# Patient Record
Sex: MALE | Employment: UNEMPLOYED | URBAN - METROPOLITAN AREA
[De-identification: names, ages, dates, MRNs, and addresses within clinical notes are randomized per-mention and may not be internally consistent; named-entity substitution may affect disease eponyms.]

---

## 2021-03-24 ENCOUNTER — TRANSFERRED RECORDS (OUTPATIENT)
Dept: HEALTH INFORMATION MANAGEMENT | Facility: CLINIC | Age: 51
End: 2021-03-24

## 2021-08-04 ENCOUNTER — TRANSFERRED RECORDS (OUTPATIENT)
Dept: HEALTH INFORMATION MANAGEMENT | Facility: CLINIC | Age: 51
End: 2021-08-04

## 2021-08-06 ENCOUNTER — MEDICAL CORRESPONDENCE (OUTPATIENT)
Dept: HEALTH INFORMATION MANAGEMENT | Facility: CLINIC | Age: 51
End: 2021-08-06

## 2021-08-11 NOTE — TELEPHONE ENCOUNTER
FUTURE VISIT INFORMATION      FUTURE VISIT INFORMATION:    Date: 8/25/21    Time: 11:30am    Location: [facility] [place]  REFERRAL INFORMATION:    Referring provider:  N/A    Referring providers clinic:  N/A    Reason for visit/diagnosis  foot wound    RECORDS REQUESTED FROM:       Clinic name Comments Records Status Imaging Status   [facility] OV/notes 2/15/21-3/18/21 Care Everywhere    [facility]  Ov/notes 2/1/21-3/23/21 Care Everywhere

## 2021-08-25 ENCOUNTER — PRE VISIT (OUTPATIENT)
Dept: SURGERY | Facility: CLINIC | Age: 51
End: 2021-08-25

## 2021-09-27 ENCOUNTER — HOSPITAL ENCOUNTER (INPATIENT)
Facility: CLINIC | Age: 51
LOS: 3 days | Discharge: HOME | End: 2021-09-30
Attending: EMERGENCY MEDICINE | Admitting: FAMILY MEDICINE
Payer: MEDICAID

## 2021-09-27 ENCOUNTER — APPOINTMENT (OUTPATIENT)
Dept: GENERAL RADIOLOGY | Facility: CLINIC | Age: 51
End: 2021-09-27
Payer: MEDICAID

## 2021-09-27 DIAGNOSIS — I73.9 PERIPHERAL ARTERY DISEASE (H): ICD-10-CM

## 2021-09-27 DIAGNOSIS — Z11.52 ENCOUNTER FOR SCREENING LABORATORY TESTING FOR SEVERE ACUTE RESPIRATORY SYNDROME CORONAVIRUS 2 (SARS-COV-2): ICD-10-CM

## 2021-09-27 DIAGNOSIS — E44.0 MODERATE PROTEIN-CALORIE MALNUTRITION (H): ICD-10-CM

## 2021-09-27 DIAGNOSIS — S81.802A OPEN WOUND OF LEFT KNEE, LEG, AND ANKLE, INITIAL ENCOUNTER: Primary | ICD-10-CM

## 2021-09-27 DIAGNOSIS — R89.5 POSITIVE CULTURE FINDINGS IN WOUND: ICD-10-CM

## 2021-09-27 DIAGNOSIS — S91.002A OPEN WOUND OF LEFT KNEE, LEG, AND ANKLE, INITIAL ENCOUNTER: Primary | ICD-10-CM

## 2021-09-27 DIAGNOSIS — S81.002A OPEN WOUND OF LEFT KNEE, LEG, AND ANKLE, INITIAL ENCOUNTER: Primary | ICD-10-CM

## 2021-09-27 DIAGNOSIS — E11.9 TYPE 2 DIABETES MELLITUS (H): ICD-10-CM

## 2021-09-27 PROBLEM — R78.81 POSITIVE BLOOD CULTURE: Status: ACTIVE | Noted: 2021-09-27

## 2021-09-27 LAB
ALBUMIN SERPL-MCNC: 2.4 G/DL (ref 3.4–5)
ALP SERPL-CCNC: 142 U/L (ref 40–150)
ALT SERPL W P-5'-P-CCNC: 48 U/L (ref 0–70)
ANION GAP SERPL CALCULATED.3IONS-SCNC: 10 MMOL/L (ref 3–14)
AST SERPL W P-5'-P-CCNC: 98 U/L (ref 0–45)
BASOPHILS # BLD AUTO: 0 10E3/UL (ref 0–0.2)
BASOPHILS NFR BLD AUTO: 0 %
BILIRUB SERPL-MCNC: 0.8 MG/DL (ref 0.2–1.3)
BUN SERPL-MCNC: 21 MG/DL (ref 7–30)
CALCIUM SERPL-MCNC: 8.8 MG/DL (ref 8.5–10.1)
CHLORIDE BLD-SCNC: 101 MMOL/L (ref 94–109)
CO2 SERPL-SCNC: 24 MMOL/L (ref 20–32)
CREAT SERPL-MCNC: 1.75 MG/DL (ref 0.66–1.25)
CRP SERPL-MCNC: 31 MG/L (ref 0–8)
EOSINOPHIL # BLD AUTO: 0.1 10E3/UL (ref 0–0.7)
EOSINOPHIL NFR BLD AUTO: 1 %
ERYTHROCYTE [DISTWIDTH] IN BLOOD BY AUTOMATED COUNT: 14.5 % (ref 10–15)
ERYTHROCYTE [SEDIMENTATION RATE] IN BLOOD BY WESTERGREN METHOD: 54 MM/HR (ref 0–20)
GFR SERPL CREATININE-BSD FRML MDRD: 44 ML/MIN/1.73M2
GLUCOSE BLD-MCNC: 111 MG/DL (ref 70–99)
HCT VFR BLD AUTO: 38.8 % (ref 40–53)
HGB BLD-MCNC: 12.9 G/DL (ref 13.3–17.7)
IMM GRANULOCYTES # BLD: 0.1 10E3/UL
IMM GRANULOCYTES NFR BLD: 1 %
LYMPHOCYTES # BLD AUTO: 0.7 10E3/UL (ref 0.8–5.3)
LYMPHOCYTES NFR BLD AUTO: 10 %
MAGNESIUM SERPL-MCNC: 1.5 MG/DL (ref 1.6–2.3)
MCH RBC QN AUTO: 30.8 PG (ref 26.5–33)
MCHC RBC AUTO-ENTMCNC: 33.2 G/DL (ref 31.5–36.5)
MCV RBC AUTO: 93 FL (ref 78–100)
MONOCYTES # BLD AUTO: 0.7 10E3/UL (ref 0–1.3)
MONOCYTES NFR BLD AUTO: 10 %
NEUTROPHILS # BLD AUTO: 5.5 10E3/UL (ref 1.6–8.3)
NEUTROPHILS NFR BLD AUTO: 78 %
NRBC # BLD AUTO: 0 10E3/UL
NRBC BLD AUTO-RTO: 0 /100
PHOSPHATE SERPL-MCNC: 3.3 MG/DL (ref 2.5–4.5)
PLATELET # BLD AUTO: 206 10E3/UL (ref 150–450)
POTASSIUM BLD-SCNC: 3.8 MMOL/L (ref 3.4–5.3)
PROT SERPL-MCNC: 8.2 G/DL (ref 6.8–8.8)
RBC # BLD AUTO: 4.19 10E6/UL (ref 4.4–5.9)
SARS-COV-2 RNA RESP QL NAA+PROBE: NEGATIVE
SODIUM SERPL-SCNC: 135 MMOL/L (ref 133–144)
TSH SERPL DL<=0.005 MIU/L-ACNC: 1.37 MU/L (ref 0.4–4)
WBC # BLD AUTO: 7.1 10E3/UL (ref 4–11)

## 2021-09-27 PROCEDURE — 36415 COLL VENOUS BLD VENIPUNCTURE: CPT | Performed by: EMERGENCY MEDICINE

## 2021-09-27 PROCEDURE — 85652 RBC SED RATE AUTOMATED: CPT | Performed by: EMERGENCY MEDICINE

## 2021-09-27 PROCEDURE — 84100 ASSAY OF PHOSPHORUS: CPT | Performed by: STUDENT IN AN ORGANIZED HEALTH CARE EDUCATION/TRAINING PROGRAM

## 2021-09-27 PROCEDURE — 87040 BLOOD CULTURE FOR BACTERIA: CPT | Performed by: EMERGENCY MEDICINE

## 2021-09-27 PROCEDURE — 250N000011 HC RX IP 250 OP 636: Performed by: EMERGENCY MEDICINE

## 2021-09-27 PROCEDURE — U0005 INFEC AGEN DETEC AMPLI PROBE: HCPCS | Performed by: EMERGENCY MEDICINE

## 2021-09-27 PROCEDURE — 99285 EMERGENCY DEPT VISIT HI MDM: CPT | Performed by: EMERGENCY MEDICINE

## 2021-09-27 PROCEDURE — 96365 THER/PROPH/DIAG IV INF INIT: CPT | Performed by: EMERGENCY MEDICINE

## 2021-09-27 PROCEDURE — 85025 COMPLETE CBC W/AUTO DIFF WBC: CPT | Performed by: EMERGENCY MEDICINE

## 2021-09-27 PROCEDURE — 99285 EMERGENCY DEPT VISIT HI MDM: CPT | Mod: 25 | Performed by: EMERGENCY MEDICINE

## 2021-09-27 PROCEDURE — 84134 ASSAY OF PREALBUMIN: CPT | Performed by: STUDENT IN AN ORGANIZED HEALTH CARE EDUCATION/TRAINING PROGRAM

## 2021-09-27 PROCEDURE — 83735 ASSAY OF MAGNESIUM: CPT | Performed by: STUDENT IN AN ORGANIZED HEALTH CARE EDUCATION/TRAINING PROGRAM

## 2021-09-27 PROCEDURE — 73610 X-RAY EXAM OF ANKLE: CPT | Mod: LT

## 2021-09-27 PROCEDURE — 120N000011 HC R&B TRANSPLANT UMMC

## 2021-09-27 PROCEDURE — 80053 COMPREHEN METABOLIC PANEL: CPT | Performed by: EMERGENCY MEDICINE

## 2021-09-27 PROCEDURE — 84443 ASSAY THYROID STIM HORMONE: CPT | Performed by: STUDENT IN AN ORGANIZED HEALTH CARE EDUCATION/TRAINING PROGRAM

## 2021-09-27 PROCEDURE — 86140 C-REACTIVE PROTEIN: CPT | Performed by: EMERGENCY MEDICINE

## 2021-09-27 PROCEDURE — 83036 HEMOGLOBIN GLYCOSYLATED A1C: CPT | Performed by: STUDENT IN AN ORGANIZED HEALTH CARE EDUCATION/TRAINING PROGRAM

## 2021-09-27 PROCEDURE — 99223 1ST HOSP IP/OBS HIGH 75: CPT | Mod: AI | Performed by: FAMILY MEDICINE

## 2021-09-27 PROCEDURE — 96366 THER/PROPH/DIAG IV INF ADDON: CPT | Performed by: EMERGENCY MEDICINE

## 2021-09-27 PROCEDURE — C9803 HOPD COVID-19 SPEC COLLECT: HCPCS | Performed by: EMERGENCY MEDICINE

## 2021-09-27 PROCEDURE — 73610 X-RAY EXAM OF ANKLE: CPT | Mod: 26 | Performed by: RADIOLOGY

## 2021-09-27 PROCEDURE — 258N000003 HC RX IP 258 OP 636: Performed by: EMERGENCY MEDICINE

## 2021-09-27 RX ORDER — LEVOFLOXACIN 750 MG/1
750 TABLET, FILM COATED ORAL DAILY
Status: ON HOLD | COMMUNITY
End: 2021-09-30

## 2021-09-27 RX ORDER — MULTIPLE VITAMINS W/ MINERALS TAB 9MG-400MCG
1 TAB ORAL DAILY
Status: DISCONTINUED | OUTPATIENT
Start: 2021-09-28 | End: 2021-09-30 | Stop reason: HOSPADM

## 2021-09-27 RX ORDER — LIDOCAINE 40 MG/G
CREAM TOPICAL
Status: DISCONTINUED | OUTPATIENT
Start: 2021-09-27 | End: 2021-09-30 | Stop reason: HOSPADM

## 2021-09-27 RX ORDER — PIPERACILLIN SODIUM, TAZOBACTAM SODIUM 4; .5 G/20ML; G/20ML
4.5 INJECTION, POWDER, LYOPHILIZED, FOR SOLUTION INTRAVENOUS ONCE
Status: COMPLETED | OUTPATIENT
Start: 2021-09-27 | End: 2021-09-27

## 2021-09-27 RX ORDER — ACETAMINOPHEN 650 MG/1
650 SUPPOSITORY RECTAL EVERY 6 HOURS PRN
Status: DISCONTINUED | OUTPATIENT
Start: 2021-09-27 | End: 2021-09-30 | Stop reason: HOSPADM

## 2021-09-27 RX ORDER — DIAZEPAM 5 MG
10 TABLET ORAL EVERY 30 MIN PRN
Status: DISCONTINUED | OUTPATIENT
Start: 2021-09-27 | End: 2021-09-29

## 2021-09-27 RX ORDER — SODIUM CHLORIDE, SODIUM LACTATE, POTASSIUM CHLORIDE, CALCIUM CHLORIDE 600; 310; 30; 20 MG/100ML; MG/100ML; MG/100ML; MG/100ML
INJECTION, SOLUTION INTRAVENOUS CONTINUOUS
Status: DISCONTINUED | OUTPATIENT
Start: 2021-09-27 | End: 2021-09-28

## 2021-09-27 RX ORDER — NICOTINE POLACRILEX 4 MG
15-30 LOZENGE BUCCAL
Status: DISCONTINUED | OUTPATIENT
Start: 2021-09-27 | End: 2021-09-30 | Stop reason: HOSPADM

## 2021-09-27 RX ORDER — CEPHALEXIN 500 MG/1
500 CAPSULE ORAL 2 TIMES DAILY
Status: ON HOLD | COMMUNITY
End: 2021-09-30

## 2021-09-27 RX ORDER — DEXTROSE MONOHYDRATE 25 G/50ML
25-50 INJECTION, SOLUTION INTRAVENOUS
Status: DISCONTINUED | OUTPATIENT
Start: 2021-09-27 | End: 2021-09-30 | Stop reason: HOSPADM

## 2021-09-27 RX ORDER — SODIUM CHLOR/HYPOCHLOROUS ACID 0.033 %
SOLUTION, IRRIGATION IRRIGATION
Status: CANCELLED | OUTPATIENT
Start: 2021-09-27

## 2021-09-27 RX ORDER — SODIUM CHLORIDE 9 MG/ML
INJECTION, SOLUTION INTRAVENOUS CONTINUOUS
Status: DISCONTINUED | OUTPATIENT
Start: 2021-09-27 | End: 2021-09-27

## 2021-09-27 RX ORDER — SODIUM CHLOR/HYPOCHLOROUS ACID 0.033 %
SOLUTION, IRRIGATION IRRIGATION
COMMUNITY
Start: 2021-03-25

## 2021-09-27 RX ORDER — FOLIC ACID 1 MG/1
1 TABLET ORAL DAILY
Status: DISCONTINUED | OUTPATIENT
Start: 2021-09-28 | End: 2021-09-30 | Stop reason: HOSPADM

## 2021-09-27 RX ORDER — PIPERACILLIN SODIUM, TAZOBACTAM SODIUM 4; .5 G/20ML; G/20ML
4.5 INJECTION, POWDER, LYOPHILIZED, FOR SOLUTION INTRAVENOUS EVERY 6 HOURS
Status: DISCONTINUED | OUTPATIENT
Start: 2021-09-28 | End: 2021-09-29

## 2021-09-27 RX ORDER — LANOLIN ALCOHOL/MO/W.PET/CERES
100 CREAM (GRAM) TOPICAL DAILY
Status: DISCONTINUED | OUTPATIENT
Start: 2021-09-28 | End: 2021-09-30 | Stop reason: HOSPADM

## 2021-09-27 RX ORDER — ACETAMINOPHEN 325 MG/1
650 TABLET ORAL EVERY 6 HOURS PRN
Status: DISCONTINUED | OUTPATIENT
Start: 2021-09-27 | End: 2021-09-30 | Stop reason: HOSPADM

## 2021-09-27 RX ADMIN — PIPERACILLIN SODIUM AND TAZOBACTAM SODIUM 4.5 G: 4; .5 INJECTION, POWDER, LYOPHILIZED, FOR SOLUTION INTRAVENOUS at 17:58

## 2021-09-27 RX ADMIN — SODIUM CHLORIDE: 9 INJECTION, SOLUTION INTRAVENOUS at 16:52

## 2021-09-27 RX ADMIN — VANCOMYCIN HYDROCHLORIDE 1750 MG: 1 INJECTION, POWDER, LYOPHILIZED, FOR SOLUTION INTRAVENOUS at 18:38

## 2021-09-27 RX ADMIN — SODIUM CHLORIDE 1000 ML: 9 INJECTION, SOLUTION INTRAVENOUS at 15:52

## 2021-09-27 ASSESSMENT — ACTIVITIES OF DAILY LIVING (ADL)
VISION_MANAGEMENT: GLASSES
WALKING_OR_CLIMBING_STAIRS: OTHER (SEE COMMENTS)
DOING_ERRANDS_INDEPENDENTLY_DIFFICULTY: YES
PATIENT_/_FAMILY_COMMUNICATION_STYLE: SPOKEN LANGUAGE (ENGLISH OR BILINGUAL)
WALKING_OR_CLIMBING_STAIRS_DIFFICULTY: YES
NUMBER_OF_TIMES_PATIENT_HAS_FALLEN_WITHIN_LAST_SIX_MONTHS: 3
VISION_MANAGEMENT: GLASSES
TOILETING_ISSUES: NO
WEAR_GLASSES_OR_BLIND: YES
PATIENT_/_FAMILY_COMMUNICATION_STYLE: SPOKEN LANGUAGE (ENGLISH OR BILINGUAL)
CONCENTRATING,_REMEMBERING_OR_MAKING_DECISIONS_DIFFICULTY: NO
WALKING_OR_CLIMBING_STAIRS_DIFFICULTY: YES
DIFFICULTY_COMMUNICATING: NO
DRESSING/BATHING_DIFFICULTY: YES
CONCENTRATING,_REMEMBERING_OR_MAKING_DECISIONS_DIFFICULTY: NO
DRESSING/BATHING_DIFFICULTY: YES
WHICH_OF_THE_ABOVE_FUNCTIONAL_RISKS_HAD_A_RECENT_ONSET_OR_CHANGE?: FALL HISTORY;AMBULATION
TOILETING_ISSUES: NO
FALL_HISTORY_WITHIN_LAST_SIX_MONTHS: YES
DOING_ERRANDS_INDEPENDENTLY_DIFFICULTY: YES
EQUIPMENT_CURRENTLY_USED_AT_HOME: WALKER, ROLLING;OTHER (SEE COMMENTS)
HEARING_DIFFICULTY_OR_DEAF: NO
DIFFICULTY_COMMUNICATING: NO
WEAR_GLASSES_OR_BLIND: YES
HEARING_DIFFICULTY_OR_DEAF: NO
DIFFICULTY_EATING/SWALLOWING: NO
DIFFICULTY_EATING/SWALLOWING: NO

## 2021-09-27 ASSESSMENT — MIFFLIN-ST. JEOR
SCORE: 1652.78
SCORE: 1699.04

## 2021-09-27 NOTE — ED NOTES
[facility]   ED Nurse to Floor Handoff     Son Mahoney is a 51 year old male who speaks English and lives with family members,  in a home  They arrived in the ED by car from clinic    ED Chief Complaint: Wound Infection    ED Dx;   Final diagnoses:   Positive blood culture   Open wound of left knee, leg, and ankle, initial encounter         Needed?: No    Allergies: No Known Allergies.  Past Medical Hx: History reviewed. No pertinent past medical history.   Baseline Mental status: WDL  Current Mental Status changes: at basesline    Infection present or suspected this encounter: cultures pending  Sepsis suspected: No  Isolation type: No active isolations  Patient tested for COVID 19 prior to admission: YES     Activity level - Baseline/Home:  Walker  Activity Level - Current:   Walker    Bariatric equipment needed?: No    In the ED these meds were given:   Medications   0.9% sodium chloride BOLUS (0 mLs Intravenous Stopped 9/27/21 1652)     Followed by   sodium chloride 0.9% infusion ( Intravenous New Bag 9/27/21 1652)   piperacillin-tazobactam (ZOSYN) 4.5 g vial to attach to  mL bag (has no administration in time range)   vancomycin (VANCOCIN) 1,750 mg in sodium chloride 0.9 % 500 mL intermittent infusion (has no administration in time range)   vancomycin place tavera - receiving intermittent dosing (has no administration in time range)       Drips running?  No    Home pump  No    Current LDAs  Peripheral IV 09/27/21 Right Upper forearm (Active)   Site Assessment WDL 09/27/21 1522   Line Status Saline locked 09/27/21 1522   Phlebitis Scale 0-->no symptoms 09/27/21 1522   Number of days: 0       Labs results:   Labs Ordered and Resulted from Time of ED Arrival Up to the Time of Departure from the ED   COMPREHENSIVE METABOLIC PANEL - Abnormal; Notable for the following components:       Result Value    Creatinine 1.75 (*)     Glucose 111 (*)     AST  "98 (*)     Albumin 2.4 (*)     GFR Estimate 44 (*)     All other components within normal limits   CRP INFLAMMATION - Abnormal; Notable for the following components:    CRP Inflammation 31.0 (*)     All other components within normal limits   ERYTHROCYTE SEDIMENTATION RATE AUTO - Abnormal; Notable for the following components:    Erythrocyte Sedimentation Rate 54 (*)     All other components within normal limits   CBC WITH PLATELETS AND DIFFERENTIAL - Abnormal; Notable for the following components:    RBC Count 4.19 (*)     Hemoglobin 12.9 (*)     Hematocrit 38.8 (*)     Absolute Lymphocytes 0.7 (*)     Absolute Immature Granulocytes 0.1 (*)     All other components within normal limits   BLOOD CULTURE   BLOOD CULTURE   CBC WITH PLATELETS & DIFFERENTIAL    Narrative:     The following orders were created for panel order CBC with platelets differential.  Procedure                               Abnormality         Status                     ---------                               -----------         ------                     CBC with platelets and d...[[id number]]  Abnormal            Final result                 Please view results for these tests on the individual orders.       Imaging Studies: No results found for this or any previous visit (from the past 24 hour(s)).    Recent vital signs:   BP (!) 117/97   Pulse 111   Temp 97.7  F (36.5  C) (Temporal)   Resp 16   Ht 1.905 m (6' 3\")   Wt 71.2 kg (157 lb)   SpO2 99%   BMI 19.62 kg/m      Ian Coma Scale Score: 15 (09/27/21 1501)       Cardiac Rhythm: Tachycardia  Pt needs tele? No  Skin/wound Issues: LLE wound     Code Status: Full Code    Pain control: pt had none    Nausea control: pt had none    Abnormal labs/tests/findings requiring intervention: see epic     Family present during ED course? Yes   Family Comments/Social Situation comments: sister     Tasks needing completion: None    Bruce Kearney, RN  [phone] [facility]    "

## 2021-09-27 NOTE — LETTER
[facility] [address] [address]  [phone]    FACSIMILE TRANSMITTAL SHEET    TO: [facility] - Attention ZULEIKA Ruiz    _____URGENT _____REVIEW ONLY _____PLEASE COMMENT____PLEASE REPLY    NOTES/COMMENTS: Attached please find final discharge orders for Son Mahoney.                                            IF YOU DID NOT RECEIVE THE CORRECT NUMBER OF PAGES OR THE FAX DID NOT COME THROUGH CLEARLY, PLEASE CALL THE SENDER     CONFIDENTIALITY STATEMENT: Confidential information that may accompany this transmission contains protected health information under state and federal law and is legally privileged. This information is intended only for the use of the individual or entity named above and may be used only for carrying out treatment, payment or other healthcare operations. The recipient or person responsible for delivering this information is prohibited by law from disclosing this information without proper authorization to any other party, unless required to do so by law or regulation. If you are not the intended recipient, you are hereby notified that any review, dissemination, distribution, or copying of this message is strictly prohibited. If you have received this communication in error, please destroy the materials and contact us immediately by calling the number listed above. No response indicates that the information was received by the appropriate authorized party

## 2021-09-27 NOTE — ED TRIAGE NOTES
"Triage Assessment & Note:    BP 93/59   Pulse (!) 129   Temp 97.7  F (36.5  C) (Temporal)   Resp 12   Ht 1.905 m (6' 3\")   Wt 71.2 kg (157 lb)   SpO2 93%   BMI 19.62 kg/m        Patient presents with: Pt comes to triage from vascular surgery clinic for left lower ulceration and positive blood cultures. No reports of fever, cough, SOB, CP, or travel.     Home Treatments/Remedies: Home medications    Febrile / Afebrile: afebrile    Duration of C/o: 9 mo    Palak Minaya RN  September 27, 2021        "

## 2021-09-27 NOTE — ED PROVIDER NOTES
History     Chief Complaint   Patient presents with     Wound Infection     HPI  Son Mahoney is a 51 year old male with a past medical history of peripheral vascular disease, type 2 diabetes (with associated diabetic neuropathy, not insulin-dependent), hypertension who presents to the emergency department with a chief complaint of leg wound infection.  The patient was referred from vascular surgery clinic due to ulceration on his left lower extremity with positive blood cultures.  No associated fever, cough, SOB, CP.  No recent travel.  Wound has been present for 9 months.     Per review of records brought in by the patient, he was seen by a Dr. Salvador Trotter (vascular surgery, [place]), who reported that the patient was seen for evaluation of left medial ankle wound that started as an abscess identified on January 24 of this year.  The patient was initially seen in a local emergency department and was transferred to [facility] in [facility].  Since that time, the patient has had the wound debrided and treated with wet-to-dry dressing that was transition to endoform and wound VAC.  The patient was directed to follow-up in clinic on a weekly basis, but was not compliant with this.  Prior to today, the patient was reportedly last seen on 8/4/2021.  Per report, at that time, all previously exposed bone was covered with granulation tissue, however there was a large area of Achilles tendon exposed.  The patient was referred to a plastic surgeon at that time for consultation on flap closure of the area to preserve the tendon.  His physician reportedly talked to Dr. Di Rick at [facility] plastic surgery, but the patient never made an appointment with this provider.  The patient came in for reevaluation today reporting increased drainage from the wound.  No significant associated pain.  The patient has been using endoform and negative pressure at the site.  The patient has been  nonweightbearing and is using a knee roller.  On exam today, left lower extremity ulceration was noted with left medial portion of the wound with overlying granulation tissue and necrotic material/maceration around the edges of the wound.  A bulging was noted at the superior aspect of the wound, which was debrided, with bloody/foul-smelling material drained from this area.  However, it was thought that additional treatment would be needed and that the patient could not be discharged home at this time.  As a result, he was referred here for presumed admission to the hospital and further treatment.  The physician who saw him in clinic today did think that his leg is salvageable at this time.    He has been taking keflex and levaquin at home for several months.    Unfortunately, we do not have access to patient's blood culture results.     I have reviewed the Medications, Allergies, Past Medical and Surgical History, and Social History in the Epic system.    History reviewed. No pertinent past medical history.  History reviewed. No pertinent surgical history.  No current facility-administered medications for this encounter.     Current Outpatient Medications   Medication     cephALEXin (KEFLEX) 500 MG capsule     levofloxacin (LEVAQUIN) 750 MG tablet     No Known Allergies  Past medical history, past surgical history, medications, and allergies were reviewed with the patient. Additional pertinent items: None    Social History     Socioeconomic History     Marital status: Patient Declined     Spouse name: Not on file     Number of children: Not on file     Years of education: Not on file     Highest education level: Not on file   Occupational History     Not on file   Tobacco Use     Smoking status: Never Smoker     Smokeless tobacco: Never Used   Substance and Sexual Activity     Alcohol use: Yes     Drug use: Not Currently     Sexual activity: Not on file   Other Topics Concern     Not on file   Social History  "Narrative     Not on file     Social Determinants of Health     Financial Resource Strain:      Difficulty of Paying Living Expenses:    Food Insecurity:      Worried About Running Out of Food in the Last Year:      Ran Out of Food in the Last Year:    Transportation Needs:      Lack of Transportation (Medical):      Lack of Transportation (Non-Medical):    Physical Activity:      Days of Exercise per Week:      Minutes of Exercise per Session:    Stress:      Feeling of Stress :    Social Connections:      Frequency of Communication with Friends and Family:      Frequency of Social Gatherings with Friends and Family:      Attends Tenri[employer] Services:      Active Member of Clubs or Organizations:      Attends Club or Organization Meetings:      Marital Status:    Intimate Partner Violence:      Fear of Current or Ex-Partner:      Emotionally Abused:      Physically Abused:      Sexually Abused:      Social history was reviewed with the patient. Additional pertinent items: None    Review of Systems  General: No fevers or chills  Skin: See HPI  Eyes: No eye redness or discharge  Ears/Nose/Throat: No rhinorrhea or nasal congestion  Respiratory: No cough or SOB  Cardiovascular: No chest pain or palpitations  Gastrointestinal: No nausea, vomiting, or diarrhea  Genitourinary: No urinary frequency, hematuria, or dysuria  Musculoskeletal: No arthralgias or myalgias  Neurologic: No numbness or weakness  Hematologic/Lymphatic/Immunologic: No leg swelling, no easy bruising/bleeding  Endocrine: No polyuria/polydypsia    A complete review of systems was performed with pertinent positives and negatives noted in the HPI, and all other systems negative.    Physical Exam   BP: 93/59  Pulse: (!) 129  Temp: 97.7  F (36.5  C)  Resp: 12  Height: 190.5 cm (6' 3\")  Weight: 71.2 kg (157 lb)  SpO2: 93 %      General: Well nourished, well developed, NAD  HEENT: EOMI, anicteric. NCAT, MMM  Neck: no jugular venous distension, supple, nl " ROM  Cardiac: Regular rate and rhythm. No murmurs, rubs, or gallops. Normal S1, S2.  Intact peripheral pulses  Pulm: CTAB, no stridor, wheezes, rales, rhonchi   Skin: Warm and dry to the touch.  Wound to LLE as described below  Extremities: wound to medial left lower leg overlying ankle with packing in place, draining foul smelling fluid, distally intact to light touch, patient is able to move L toes without difficulty, diminished DP pulses BL  Neuro: A&Ox3, no gross focal deficits    ED Course        Procedures                          Labs Ordered and Resulted from Time of ED Arrival Up to the Time of Departure from the ED   COMPREHENSIVE METABOLIC PANEL - Abnormal; Notable for the following components:       Result Value    Creatinine 1.75 (*)     Glucose 111 (*)     AST 98 (*)     Albumin 2.4 (*)     GFR Estimate 44 (*)     All other components within normal limits   CRP INFLAMMATION - Abnormal; Notable for the following components:    CRP Inflammation 31.0 (*)     All other components within normal limits   ERYTHROCYTE SEDIMENTATION RATE AUTO - Abnormal; Notable for the following components:    Erythrocyte Sedimentation Rate 54 (*)     All other components within normal limits   CBC WITH PLATELETS AND DIFFERENTIAL - Abnormal; Notable for the following components:    RBC Count 4.19 (*)     Hemoglobin 12.9 (*)     Hematocrit 38.8 (*)     Absolute Lymphocytes 0.7 (*)     Absolute Immature Granulocytes 0.1 (*)     All other components within normal limits   BLOOD CULTURE   BLOOD CULTURE   CBC WITH PLATELETS & DIFFERENTIAL    Narrative:     The following orders were created for panel order CBC with platelets differential.  Procedure                               Abnormality         Status                     ---------                               -----------         ------                     CBC with platelets and d...[[id number]]  Abnormal            Final result                 Please view results for these tests  on the individual orders.            Results for orders placed or performed during the hospital encounter of 09/27/21 (from the past 24 hour(s))   CBC with platelets differential    Narrative    The following orders were created for panel order CBC with platelets differential.  Procedure                               Abnormality         Status                     ---------                               -----------         ------                     CBC with platelets and d...[[id number]]  Abnormal            Final result                 Please view results for these tests on the individual orders.   Comprehensive metabolic panel   Result Value Ref Range    Sodium 135 133 - 144 mmol/L    Potassium 3.8 3.4 - 5.3 mmol/L    Chloride 101 94 - 109 mmol/L    Carbon Dioxide (CO2) 24 20 - 32 mmol/L    Anion Gap 10 3 - 14 mmol/L    Urea Nitrogen 21 7 - 30 mg/dL    Creatinine 1.75 (H) 0.66 - 1.25 mg/dL    Calcium 8.8 8.5 - 10.1 mg/dL    Glucose 111 (H) 70 - 99 mg/dL    Alkaline Phosphatase 142 40 - 150 U/L    AST 98 (H) 0 - 45 U/L    ALT 48 0 - 70 U/L    Protein Total 8.2 6.8 - 8.8 g/dL    Albumin 2.4 (L) 3.4 - 5.0 g/dL    Bilirubin Total 0.8 0.2 - 1.3 mg/dL    GFR Estimate 44 (L) >60 mL/min/1.73m2   CRP inflammation   Result Value Ref Range    CRP Inflammation 31.0 (H) 0.0 - 8.0 mg/L   CBC with platelets and differential   Result Value Ref Range    WBC Count 7.1 4.0 - 11.0 10e3/uL    RBC Count 4.19 (L) 4.40 - 5.90 10e6/uL    Hemoglobin 12.9 (L) 13.3 - 17.7 g/dL    Hematocrit 38.8 (L) 40.0 - 53.0 %    MCV 93 78 - 100 fL    MCH 30.8 26.5 - 33.0 pg    MCHC 33.2 31.5 - 36.5 g/dL    RDW 14.5 10.0 - 15.0 %    Platelet Count 206 150 - 450 10e3/uL    % Neutrophils 78 %    % Lymphocytes 10 %    % Monocytes 10 %    % Eosinophils 1 %    % Basophils 0 %    % Immature Granulocytes 1 %    NRBCs per 100 WBC 0 <1 /100    Absolute Neutrophils 5.5 1.6 - 8.3 10e3/uL    Absolute Lymphocytes 0.7 (L) 0.8 - 5.3 10e3/uL    Absolute Monocytes 0.7 0.0  - 1.3 10e3/uL    Absolute Eosinophils 0.1 0.0 - 0.7 10e3/uL    Absolute Basophils 0.0 0.0 - 0.2 10e3/uL    Absolute Immature Granulocytes 0.1 (H) <=0.0 10e3/uL    Absolute NRBCs 0.0 10e3/uL   Erythrocyte sedimentation rate auto   Result Value Ref Range    Erythrocyte Sedimentation Rate 54 (H) 0 - 20 mm/hr       Labs, vital signs, and imaging studies were reviewed by me.    Medications   0.9% sodium chloride BOLUS (1,000 mLs Intravenous New Bag 9/27/21 2233)     Followed by   sodium chloride 0.9% infusion (has no administration in time range)       Assessments & Plan (with Medical Decision Making)   Son Mahoney is a 51 year old male who presents with leg wound, new findings concerning for worsening infection despite outpatient antibiotics identified in vascular surgery clinic today. Labs and repeat blood cultures were sent. Borderline low blood pressures and tachycardia on arrival to the ER. Pt is afebrile. IV fluids were given. Which resulted in improved vital signs.     Labs remarkable for elevated Cr, ESR, CRP. WBC count is normal.     I have reviewed the nursing notes.    I have reviewed the findings, diagnosis, plan and need for follow up with the patient.    1725 Patient discussed with IM, to be admitted to their service for further management. Plan was discussed with patient who understands and agrees with plan.     Antibiotics were ordered    1730 Pt d/w ortho, they will see the patient and consult as well. They also recommend vascular surgery consult.     New Prescriptions    No medications on file       Final diagnoses:   Positive blood culture   Open wound of left knee, leg, and ankle, initial encounter     Rosi Cantu MD  9/27/2021   [facility] EMERGENCY DEPARTMENT     Rosi Cantu MD  09/28/21 [id number]

## 2021-09-27 NOTE — PHARMACY-VANCOMYCIN DOSING SERVICE
"Pharmacy Vancomycin Initial Note  Date of Service 2021  Patient's  1970  51 year old, male    Indication: Sepsis    Current estimated CrCl = Estimated Creatinine Clearance: 50.3 mL/min (A) (based on SCr of 1.75 mg/dL (H)).    Creatinine for last 3 days  2021:  3:13 PM Creatinine 1.75 mg/dL    Recent Vancomycin Level(s) for last 3 days  No results found for requested labs within last 72 hours.      Vancomycin IV Administrations (past 72 hours)      No vancomycin orders with administrations in past 72 hours.                Nephrotoxins and other renal medications (From now, onward)    Start     Dose/Rate Route Frequency Ordered Stop    21 1735  vancomycin (VANCOCIN) 1,750 mg in sodium chloride 0.9 % 500 mL intermittent infusion      1,750 mg  over 120 Minutes Intravenous ONCE 21 1733      09/27/21 1733  vancomycin place tavera - receiving intermittent dosing      1 each Intravenous SEE ADMIN INSTRUCTIONS 21 1733      09/27/21 1730  piperacillin-tazobactam (ZOSYN) 4.5 g vial to attach to  mL bag     Note to Pharmacy: For [facility], [facility] and [facility]: For Zosyn-naive patients, use the \"Zosyn initial dose + extended infusion\" order panel.    4.5 g  over 30 Minutes Intravenous ONCE 21 1728            Contrast Orders - past 72 hours (72h ago, onward)    None          Scr of 1.75 (~1 in 2021), likely REMBERTO        Plan:  1. Start vancomycin  1750 mg IV once then intermittent dosing.   2. Vancomycin monitoring method: Trough (Method 2 = manual dose calculation)  3. Vancomycin therapeutic monitoring goal: 15-20 mg/L  4. Pharmacy will check vancomycin levels as appropriate in 1-3 Days.    5. Serum creatinine levels will be ordered daily for the first week of therapy and at least twice weekly for subsequent weeks.      Vitaly Dumont [facility]  "

## 2021-09-28 ENCOUNTER — APPOINTMENT (OUTPATIENT)
Dept: ULTRASOUND IMAGING | Facility: CLINIC | Age: 51
End: 2021-09-28
Payer: MEDICAID

## 2021-09-28 ENCOUNTER — APPOINTMENT (OUTPATIENT)
Dept: PHYSICAL THERAPY | Facility: CLINIC | Age: 51
End: 2021-09-28
Payer: MEDICAID

## 2021-09-28 ENCOUNTER — APPOINTMENT (OUTPATIENT)
Dept: MRI IMAGING | Facility: CLINIC | Age: 51
End: 2021-09-28
Payer: MEDICAID

## 2021-09-28 PROBLEM — E11.9 TYPE 2 DIABETES MELLITUS (H): Status: ACTIVE | Noted: 2021-01-24

## 2021-09-28 PROBLEM — R78.81 POSITIVE BLOOD CULTURE: Status: RESOLVED | Noted: 2021-09-27 | Resolved: 2021-09-28

## 2021-09-28 PROBLEM — I10 ESSENTIAL HYPERTENSION: Status: ACTIVE | Noted: 2021-01-24

## 2021-09-28 PROBLEM — I73.9 PERIPHERAL ARTERY DISEASE (H): Status: ACTIVE | Noted: 2021-01-27

## 2021-09-28 PROBLEM — E44.0 MODERATE PROTEIN-CALORIE MALNUTRITION (H): Status: ACTIVE | Noted: 2021-01-27

## 2021-09-28 LAB
ALBUMIN SERPL-MCNC: 2.2 G/DL (ref 3.4–5)
ALBUMIN UR-MCNC: 50 MG/DL
ALP SERPL-CCNC: 113 U/L (ref 40–150)
ALT SERPL W P-5'-P-CCNC: 37 U/L (ref 0–70)
ANION GAP SERPL CALCULATED.3IONS-SCNC: 7 MMOL/L (ref 3–14)
APPEARANCE UR: CLEAR
APTT PPP: 37 SECONDS (ref 22–38)
AST SERPL W P-5'-P-CCNC: 65 U/L (ref 0–45)
BASOPHILS # BLD AUTO: 0 10E3/UL (ref 0–0.2)
BASOPHILS NFR BLD AUTO: 1 %
BILIRUB SERPL-MCNC: 0.8 MG/DL (ref 0.2–1.3)
BILIRUB UR QL STRIP: NEGATIVE
BUN SERPL-MCNC: 18 MG/DL (ref 7–30)
CALCIUM SERPL-MCNC: 8 MG/DL (ref 8.5–10.1)
CHLORIDE BLD-SCNC: 108 MMOL/L (ref 94–109)
CO2 SERPL-SCNC: 23 MMOL/L (ref 20–32)
COLOR UR AUTO: ABNORMAL
CREAT SERPL-MCNC: 1.08 MG/DL (ref 0.66–1.25)
CREAT UR-MCNC: 166 MG/DL
CRP SERPL-MCNC: 30 MG/L (ref 0–8)
EOSINOPHIL # BLD AUTO: 0.1 10E3/UL (ref 0–0.7)
EOSINOPHIL NFR BLD AUTO: 2 %
ERYTHROCYTE [DISTWIDTH] IN BLOOD BY AUTOMATED COUNT: 14.7 % (ref 10–15)
FRACT EXCRET NA UR+SERPL-RTO: 0.8 %
GFR SERPL CREATININE-BSD FRML MDRD: 79 ML/MIN/1.73M2
GLUCOSE BLD-MCNC: 74 MG/DL (ref 70–99)
GLUCOSE BLDC GLUCOMTR-MCNC: 101 MG/DL (ref 70–99)
GLUCOSE BLDC GLUCOMTR-MCNC: 134 MG/DL (ref 70–99)
GLUCOSE BLDC GLUCOMTR-MCNC: 62 MG/DL (ref 70–99)
GLUCOSE BLDC GLUCOMTR-MCNC: 84 MG/DL (ref 70–99)
GLUCOSE BLDC GLUCOMTR-MCNC: 96 MG/DL (ref 70–99)
GLUCOSE BLDC GLUCOMTR-MCNC: 97 MG/DL (ref 70–99)
GLUCOSE UR STRIP-MCNC: NEGATIVE MG/DL
GRANULAR CAST: 1 /LPF
HBA1C MFR BLD: 6.5 % (ref 0–5.6)
HCT VFR BLD AUTO: 32.6 % (ref 40–53)
HGB BLD-MCNC: 10.8 G/DL (ref 13.3–17.7)
HGB UR QL STRIP: ABNORMAL
IMM GRANULOCYTES # BLD: 0.1 10E3/UL
IMM GRANULOCYTES NFR BLD: 2 %
INR PPP: 1.17 (ref 0.85–1.15)
KETONES UR STRIP-MCNC: 20 MG/DL
LEUKOCYTE ESTERASE UR QL STRIP: NEGATIVE
LYMPHOCYTES # BLD AUTO: 0.9 10E3/UL (ref 0.8–5.3)
LYMPHOCYTES NFR BLD AUTO: 16 %
MAGNESIUM SERPL-MCNC: 1.5 MG/DL (ref 1.6–2.3)
MCH RBC QN AUTO: 31.1 PG (ref 26.5–33)
MCHC RBC AUTO-ENTMCNC: 33.1 G/DL (ref 31.5–36.5)
MCV RBC AUTO: 94 FL (ref 78–100)
MONOCYTES # BLD AUTO: 0.7 10E3/UL (ref 0–1.3)
MONOCYTES NFR BLD AUTO: 13 %
MRSA DNA SPEC QL NAA+PROBE: NEGATIVE
MUCOUS THREADS #/AREA URNS LPF: PRESENT /LPF
NEUTROPHILS # BLD AUTO: 3.5 10E3/UL (ref 1.6–8.3)
NEUTROPHILS NFR BLD AUTO: 66 %
NITRATE UR QL: NEGATIVE
NRBC # BLD AUTO: 0 10E3/UL
NRBC BLD AUTO-RTO: 0 /100
PH UR STRIP: 5.5 [PH] (ref 5–7)
PHOSPHATE SERPL-MCNC: 3.2 MG/DL (ref 2.5–4.5)
PLATELET # BLD AUTO: 165 10E3/UL (ref 150–450)
POTASSIUM BLD-SCNC: 3.4 MMOL/L (ref 3.4–5.3)
PREALB SERPL IA-MCNC: 16 MG/DL (ref 15–45)
PROT SERPL-MCNC: 7.3 G/DL (ref 6.8–8.8)
RBC # BLD AUTO: 3.47 10E6/UL (ref 4.4–5.9)
RBC URINE: 1 /HPF
SA TARGET DNA: POSITIVE
SODIUM SERPL-SCNC: 138 MMOL/L (ref 133–144)
SODIUM UR-SCNC: 100 MMOL/L
SP GR UR STRIP: 1.03 (ref 1–1.03)
SQUAMOUS EPITHELIAL: <1 /HPF
UROBILINOGEN UR STRIP-MCNC: NORMAL MG/DL
WBC # BLD AUTO: 5.3 10E3/UL (ref 4–11)
WBC URINE: 3 /HPF

## 2021-09-28 PROCEDURE — 73723 MRI JOINT LWR EXTR W/O&W/DYE: CPT | Mod: 26 | Performed by: RADIOLOGY

## 2021-09-28 PROCEDURE — G0463 HOSPITAL OUTPT CLINIC VISIT: HCPCS

## 2021-09-28 PROCEDURE — 86140 C-REACTIVE PROTEIN: CPT | Performed by: STUDENT IN AN ORGANIZED HEALTH CARE EDUCATION/TRAINING PROGRAM

## 2021-09-28 PROCEDURE — 85730 THROMBOPLASTIN TIME PARTIAL: CPT | Performed by: STUDENT IN AN ORGANIZED HEALTH CARE EDUCATION/TRAINING PROGRAM

## 2021-09-28 PROCEDURE — 255N000002 HC RX 255 OP 636: Performed by: STUDENT IN AN ORGANIZED HEALTH CARE EDUCATION/TRAINING PROGRAM

## 2021-09-28 PROCEDURE — 87075 CULTR BACTERIA EXCEPT BLOOD: CPT | Performed by: STUDENT IN AN ORGANIZED HEALTH CARE EDUCATION/TRAINING PROGRAM

## 2021-09-28 PROCEDURE — A9585 GADOBUTROL INJECTION: HCPCS | Performed by: STUDENT IN AN ORGANIZED HEALTH CARE EDUCATION/TRAINING PROGRAM

## 2021-09-28 PROCEDURE — 120N000011 HC R&B TRANSPLANT UMMC

## 2021-09-28 PROCEDURE — 258N000003 HC RX IP 258 OP 636: Performed by: STUDENT IN AN ORGANIZED HEALTH CARE EDUCATION/TRAINING PROGRAM

## 2021-09-28 PROCEDURE — 87640 STAPH A DNA AMP PROBE: CPT | Performed by: STUDENT IN AN ORGANIZED HEALTH CARE EDUCATION/TRAINING PROGRAM

## 2021-09-28 PROCEDURE — 80053 COMPREHEN METABOLIC PANEL: CPT | Performed by: STUDENT IN AN ORGANIZED HEALTH CARE EDUCATION/TRAINING PROGRAM

## 2021-09-28 PROCEDURE — 250N000011 HC RX IP 250 OP 636: Performed by: STUDENT IN AN ORGANIZED HEALTH CARE EDUCATION/TRAINING PROGRAM

## 2021-09-28 PROCEDURE — 93925 LOWER EXTREMITY STUDY: CPT | Mod: 26 | Performed by: RADIOLOGY

## 2021-09-28 PROCEDURE — 83735 ASSAY OF MAGNESIUM: CPT | Performed by: STUDENT IN AN ORGANIZED HEALTH CARE EDUCATION/TRAINING PROGRAM

## 2021-09-28 PROCEDURE — 250N000009 HC RX 250: Performed by: STUDENT IN AN ORGANIZED HEALTH CARE EDUCATION/TRAINING PROGRAM

## 2021-09-28 PROCEDURE — 36415 COLL VENOUS BLD VENIPUNCTURE: CPT | Performed by: STUDENT IN AN ORGANIZED HEALTH CARE EDUCATION/TRAINING PROGRAM

## 2021-09-28 PROCEDURE — 97530 THERAPEUTIC ACTIVITIES: CPT | Mod: GP

## 2021-09-28 PROCEDURE — 97162 PT EVAL MOD COMPLEX 30 MIN: CPT | Mod: GP

## 2021-09-28 PROCEDURE — 85025 COMPLETE CBC W/AUTO DIFF WBC: CPT | Performed by: STUDENT IN AN ORGANIZED HEALTH CARE EDUCATION/TRAINING PROGRAM

## 2021-09-28 PROCEDURE — 73723 MRI JOINT LWR EXTR W/O&W/DYE: CPT | Mod: LT

## 2021-09-28 PROCEDURE — 84100 ASSAY OF PHOSPHORUS: CPT | Performed by: STUDENT IN AN ORGANIZED HEALTH CARE EDUCATION/TRAINING PROGRAM

## 2021-09-28 PROCEDURE — 93925 LOWER EXTREMITY STUDY: CPT

## 2021-09-28 PROCEDURE — 99222 1ST HOSP IP/OBS MODERATE 55: CPT | Performed by: SURGERY

## 2021-09-28 PROCEDURE — 250N000013 HC RX MED GY IP 250 OP 250 PS 637: Performed by: STUDENT IN AN ORGANIZED HEALTH CARE EDUCATION/TRAINING PROGRAM

## 2021-09-28 PROCEDURE — 87641 MR-STAPH DNA AMP PROBE: CPT | Performed by: STUDENT IN AN ORGANIZED HEALTH CARE EDUCATION/TRAINING PROGRAM

## 2021-09-28 PROCEDURE — 99207 PR NO BILLABLE SERVICE THIS VISIT: CPT | Performed by: PHYSICIAN ASSISTANT

## 2021-09-28 PROCEDURE — 97161 PT EVAL LOW COMPLEX 20 MIN: CPT | Mod: GP

## 2021-09-28 PROCEDURE — 84300 ASSAY OF URINE SODIUM: CPT | Performed by: STUDENT IN AN ORGANIZED HEALTH CARE EDUCATION/TRAINING PROGRAM

## 2021-09-28 PROCEDURE — 87186 SC STD MICRODIL/AGAR DIL: CPT | Performed by: STUDENT IN AN ORGANIZED HEALTH CARE EDUCATION/TRAINING PROGRAM

## 2021-09-28 PROCEDURE — 93922 UPR/L XTREMITY ART 2 LEVELS: CPT

## 2021-09-28 PROCEDURE — 93922 UPR/L XTREMITY ART 2 LEVELS: CPT | Mod: 26 | Performed by: RADIOLOGY

## 2021-09-28 PROCEDURE — 87076 CULTURE ANAEROBE IDENT EACH: CPT | Performed by: STUDENT IN AN ORGANIZED HEALTH CARE EDUCATION/TRAINING PROGRAM

## 2021-09-28 PROCEDURE — 87040 BLOOD CULTURE FOR BACTERIA: CPT | Performed by: STUDENT IN AN ORGANIZED HEALTH CARE EDUCATION/TRAINING PROGRAM

## 2021-09-28 PROCEDURE — 81003 URINALYSIS AUTO W/O SCOPE: CPT | Performed by: STUDENT IN AN ORGANIZED HEALTH CARE EDUCATION/TRAINING PROGRAM

## 2021-09-28 PROCEDURE — 85610 PROTHROMBIN TIME: CPT | Performed by: STUDENT IN AN ORGANIZED HEALTH CARE EDUCATION/TRAINING PROGRAM

## 2021-09-28 RX ORDER — MAGNESIUM OXIDE 400 MG/1
400 TABLET ORAL 3 TIMES DAILY
Status: COMPLETED | OUTPATIENT
Start: 2021-09-28 | End: 2021-09-30

## 2021-09-28 RX ORDER — VANCOMYCIN HYDROCHLORIDE 1 G/200ML
1000 INJECTION, SOLUTION INTRAVENOUS EVERY 12 HOURS
Status: DISCONTINUED | OUTPATIENT
Start: 2021-09-28 | End: 2021-09-29

## 2021-09-28 RX ORDER — GADOBUTROL 604.72 MG/ML
7.5 INJECTION INTRAVENOUS ONCE
Status: COMPLETED | OUTPATIENT
Start: 2021-09-28 | End: 2021-09-28

## 2021-09-28 RX ADMIN — Medication 400 MG: at 21:04

## 2021-09-28 RX ADMIN — PIPERACILLIN SODIUM AND TAZOBACTAM SODIUM 4.5 G: 4; .5 INJECTION, POWDER, LYOPHILIZED, FOR SOLUTION INTRAVENOUS at 18:34

## 2021-09-28 RX ADMIN — PIPERACILLIN SODIUM AND TAZOBACTAM SODIUM 4.5 G: 4; .5 INJECTION, POWDER, LYOPHILIZED, FOR SOLUTION INTRAVENOUS at 13:15

## 2021-09-28 RX ADMIN — PIPERACILLIN SODIUM AND TAZOBACTAM SODIUM 4.5 G: 4; .5 INJECTION, POWDER, LYOPHILIZED, FOR SOLUTION INTRAVENOUS at 00:43

## 2021-09-28 RX ADMIN — THIAMINE HCL TAB 100 MG 100 MG: 100 TAB at 08:05

## 2021-09-28 RX ADMIN — PIPERACILLIN SODIUM AND TAZOBACTAM SODIUM 4.5 G: 4; .5 INJECTION, POWDER, LYOPHILIZED, FOR SOLUTION INTRAVENOUS at 06:16

## 2021-09-28 RX ADMIN — FOLIC ACID: 5 INJECTION, SOLUTION INTRAMUSCULAR; INTRAVENOUS; SUBCUTANEOUS at 00:43

## 2021-09-28 RX ADMIN — Medication 1 TABLET: at 08:04

## 2021-09-28 RX ADMIN — VANCOMYCIN HYDROCHLORIDE 1000 MG: 1 INJECTION, SOLUTION INTRAVENOUS at 17:12

## 2021-09-28 RX ADMIN — GADOBUTROL 7.5 ML: 604.72 INJECTION INTRAVENOUS at 22:26

## 2021-09-28 RX ADMIN — Medication 400 MG: at 13:17

## 2021-09-28 RX ADMIN — FOLIC ACID 1 MG: 1 TABLET ORAL at 08:04

## 2021-09-28 ASSESSMENT — ACTIVITIES OF DAILY LIVING (ADL)
ADLS_ACUITY_SCORE: 14
ADLS_ACUITY_SCORE: 7
ADLS_ACUITY_SCORE: 13
ADLS_ACUITY_SCORE: 13
ADLS_ACUITY_SCORE: 14
ADLS_ACUITY_SCORE: 14

## 2021-09-28 ASSESSMENT — ENCOUNTER SYMPTOMS
CONSTIPATION: 0
WOUND: 1
RESPIRATORY NEGATIVE: 1
BLOOD IN STOOL: 0
MYALGIAS: 0
CARDIOVASCULAR NEGATIVE: 1
CHILLS: 1
ABDOMINAL PAIN: 0

## 2021-09-28 NOTE — PROGRESS NOTES
CLINICAL NUTRITION SERVICES - ASSESSMENT NOTE     Nutrition Prescription    RECOMMENDATIONS FOR MDs/PROVIDERS TO ORDER:  None at this time    Malnutrition Status:    Patient does not meet two of the established criteria necessary for diagnosing malnutrition but is at risk for malnutrition    Recommendations already ordered by Registered Dietitian (RD):  - Continue 75g Consistent Carbohydrate per meal  - Ordered Multivitamins with minerals  - Ordered Glucerna btw meals    Future/Additional Recommendations:  Monitor adequacy of PO intake. If documentation indicates that pt is consuming <50% nutritionally adequate meals TID or the equivalent with snacks/supplements, recommend:    Provide additional nutrition education on strategies to increase PO intake    Adjust supplement and/or scheduled snacks per pt preference    Calorie Counts to assess PO intake adequacy     REASON FOR ASSESSMENT  Son Mahoney is a/an 51 year old male assessed by the dietitian for Provider Order - Hx AUD, DM, LLE wound with generalized muscle loss nonweightbearing LLE since Jan 21, consulting to optimize nutrition    NUTRITION HISTORY  - Per EMR, Pt's sister notes he has not been eating well for the last several months with minimal intake.  He denies abdominal pain issues with his voids or stools.  He has no issues with nausea or vomiting.  - Pt confirms minimal intake over past several months due to lack of appetite. Intake currently one meal per day of tv dinners or rice-a-nadja. Previously, pt used to eat 2 meals per day.    CURRENT NUTRITION ORDERS  Diet: 75g Consistent Carbohydrate per meal  Intake/Tolerance: Pt stated he had a tuna sandwich last night for dinner and ordered lunch today: chicken tenders, sliced carrots, rice. Notes, he wasn't hungry until about 1 pm today.  BM x1 today.     LABS  Mg 1.5 (L)  AST 65 (H)  CRP 30 (H)    MEDICATIONS  Folic Acid  LSSI  Mag-Ox  Thera-Vit-M  Thiamine (B-1)    ANTHROPOMETRICS  Height: 190.5 cm  "(6' 3\")  Most Recent Weight: 75.8 kg (167 lb 3.2 oz)    IBW: 89 kg  BMI: Normal BMI  Weight History: No recent wt loss noted  Wt Readings from Last 10 Encounters:   09/27/21 75.8 kg (167 lb 3.2 oz)     Care Everywhere:  71.2 kg (157 lb) 01/24/2021      78.8 kg (173 lb 12.8 oz) 06/17/2020      86.2 kg (190 lb) 06/28/2016      Dosing Weight: 76 kg    ASSESSED NUTRITION NEEDS  Estimated Energy Needs: 3995-8910+ kcals/day (25 - 30+ kcals/kg)  Justification: Maintenance and Repletion  Estimated Protein Needs: 114-152 grams protein/day (1.5 - 2 grams of pro/kg)  Justification: Wound healing  Estimated Fluid Needs: (1 mL/kcal)   Justification: Maintenance    PHYSICAL FINDINGS  See malnutrition section below.  Non-healing wound     MALNUTRITION  % Intake: </=50% for >/= 1 month (severe)  % Weight Loss: None noted  Subcutaneous Fat Loss: None noted, visual assessment only - pt having wound dressed during visit   Muscle Loss: None noted, visual assessment only - pt having wound dressed during visit   Fluid Accumulation/Edema: None noted  Malnutrition Diagnosis: Patient does not meet two of the established criteria necessary for diagnosing malnutrition but is at risk for malnutrition    NUTRITION DIAGNOSIS  Inadequate oral intake related to lack of appetite as evidenced by pt report of intake </=50% for >/= 1 month.     INTERVENTIONS  Implementation  Nutrition Education: Provided education of role of RD in nutrition POC, importance of maintaining adequate po (encourage 3 meals per day), especially protein intakes to support strength and wound healing and prevent loss of lean body mass.      Medical food supplement therapy: Provide education on role of ONS to help optimize intakes.     Multivitamin w/minerals    Goals  Patient to consume % of nutritionally adequate meal trays TID, or the equivalent with supplements/snacks.     Monitoring/Evaluation  Progress toward goals will be monitored and evaluated per " protocol.    Myrna Barbosa RDN, LD  7A/OBS Pg [phone]

## 2021-09-28 NOTE — CONSULTS
Care Management Initial Consult    General Information  Assessment completed with: Patient, Care Team Member, -chart review,    Type of CM/SW Visit: Initial Assessment    Primary Care Provider verified and updated as needed: Yes   Readmission within the last 30 days: previous discharge plan unsuccessful   Return Category: Medically unsuccessful treatment plan  Reason for Consult: care coordination/care conference, discharge planning  Advance Care Planning:            Communication Assessment  Patient's communication style: spoken language (English or Bilingual)    Hearing Difficulty or Deaf: no   Wear Glasses or Blind: yes    Cognitive  Cognitive/Neuro/Behavioral: WDL                      Living Environment:   People in home: child(allyson), dependent (8 year old and 13 year old boys)     Current living Arrangements: house      Able to return to prior arrangements: yes       Family/Social Support:  Care provided by: self, homecare agency  Provides care for: child(allyson)     Sibling(s)          Description of Support System: Supportive    Support Assessment: Adequate family and caregiver support    Current Resources:   Patient receiving home care services: Yes  Skilled Home Care Services: Skilled Nursing  Community Resources: DME, Home Care  Equipment currently used at home: walker, rolling, tub bench, other (see comments) (scooter)  Supplies currently used at home: Wound Care Supplies    Employment/Financial:  Employment Status:          Financial Concerns: No concerns identified           Lifestyle & Psychosocial Needs:  Social Determinants of Health     Tobacco Use: Low Risk      Smoking Tobacco Use: Never Smoker     Smokeless Tobacco Use: Never Used   Alcohol Use:      Frequency of Alcohol Consumption:      Average Number of Drinks:      Frequency of Binge Drinking:    Financial Resource Strain:      Difficulty of Paying Living Expenses:    Food Insecurity:      Worried About Running Out of Food in the Last Year:   "    Ran Out of Food in the Last Year:    Transportation Needs:      Lack of Transportation (Medical):      Lack of Transportation (Non-Medical):    Physical Activity:      Days of Exercise per Week:      Minutes of Exercise per Session:    Stress:      Feeling of Stress :    Social Connections:      Frequency of Communication with Friends and Family:      Frequency of Social Gatherings with Friends and Family:      Attends Holiness Services:      Active Member of Clubs or Organizations:      Attends Club or Organization Meetings:      Marital Status:    Intimate Partner Violence:      Fear of Current or Ex-Partner:      Emotionally Abused:      Physically Abused:      Sexually Abused:    Depression:      PHQ-2 Score:    Housing Stability:      Unable to Pay for Housing in the Last Year:      Number of Places Lived in the Last Year:      Unstable Housing in the Last Year:        Functional Status:  Prior to admission patient needed assistance: Per pt he utilized a scooter for mobility.     Additional Information:  Patient admitted from vascular surgery clinic secondary to concerns with chronic left foot wound. Pt with a medical history significant for chronic left foot wound (onset 1/24/21, prior GAS SSTI), PAD, DMT2 (complicated by diabetic neuropathy), AUD, and HTN. Per chart review noted concern with adherence and pt having been lost to outpatient follow up. Noted per chart review pt has had home infusion and home care services in the past.  Pt has a home wound vac.    Met with pt.  Introduced RNCC role.  Pt confirmed his PCP is Dr. Trotter through [facility].  Per discussion with pt he has a \"Health Division Worker\", Sara [phone] that has been assisting patient with wound vac dressing changes.  Pt notes he has had IV antibiotics in recent past through [facility].  Per pt he has a wound vac through [facility].  Pt notes that his wound vac is at home and has not been working and that " he is waiting on a new wound vac to be delivered.  Per pt his sister Loida and brother Victor Manuel would be available to transport him home when medically cleared for discharge.   Per pt he was recently evaluated by [place] for home PCA services but has not received confirmation of the # hours he will be approved.  Agreed to f/u with writer once final plan for discharge has been confirmed.    VM left for Sara Health Divisions Worker requesting return call - VM was generic so no PHI was left other than writer's contact information.   Spoke with Nakia  [facility] [phone] who confirmed agency is supplying patient with a home wound vac.  Per Nakia [facility] exchanged patient wound vac on 9/9/2021.  Per Nakia if patient is currently having issues with wound vac will need a call to request a new device which can be shipped to patient with next day delivery.       Spoke with pt via phone.  Reconfirmed Sara's contact information.   Reviewed above information from  Respiratory.  Pt noted his sister was supposed to call for a new wound vac as the one he has is not holding a charge so if he is discharged with a wound vac he will need a new device.         Lorna Pal RN BSN, PHN, ACM-RN  7A RN Care Coordinator  Phone: [phone]  Pager [phone]    9/28/2021 2:58 PM

## 2021-09-28 NOTE — PLAN OF CARE
Admitted/transferred from:   Time of arrival on unit 2246  2 RN full  skin assessment completed by Rose OLIVAS And Vitaly HIDALGO  Skin assessment finding: LLE wound covered with dressing - quite severe but TRISHA. Bone/flesh exposed per report and imaging. Right foot dryness and need for toe nails to be clipped. Otherwise no other skin issues to be reported.   Interventions/actions: continue to assess need for dressing change on LLE wound. Provide pt with lotion for right foot. Continue to monitor and notify the team with any changes.      Will continue to monitor.

## 2021-09-28 NOTE — CONSULTS
[facility] Nurse Inpatient Wound Assessment   Reason for consultation: Evaluate and treat  Left medial LE wounds    Assessment  Left medial lower extremity wounds due to Trauma  Status: initial assessment  Currently clean  Treatment Plan  Left medial ankle wounds: Daily    Cleanse with microklenz moistened gauze.   Paint periwound with no-sting barrier film (cavilon lollipop)  Pack wound with hydrogel impregnated gauze ([id number])  Cover with dry 4x4 then ABD pad.  Secure with kerlix roll gauze   Home care to resume NPWT dressing changes when NPWT equipment issues is resolved    Orders Written  Recommended provider order: None, at this time  [facility] Nurse follow-up plan:weekly  Nursing to notify the Provider(s) and re-consult the [facility] Nurse if wound(s) deteriorates or new skin concern.    Patient History  According to provider note(s):  51M hx T2DM (recent A1c 6.5) c/b neuropathy, PAD, EtOH abuse, HTN, chronic LLE wound (beginning 1/2021) c/b sepsis, multiple debridements at outside facilities who presents for further evaluation and management of his chronic left foot wound.     Objective Data  Active Diet Order  Orders Placed This Encounter      Consistent Carb 75 grams CHO per Meal Diet      Output:   I/O last 3 completed shifts:  In: 820 [P.O.:120; I.V.:700]  Out: 700 [Urine:700]    Risk Assessment:   Sensory Perception: 4-->no impairment  Moisture: 4-->rarely moist  Activity: 3-->walks occasionally (uses a walker)  Mobility: 3-->slightly limited  Nutrition: 2-->probably inadequate  Friction and Shear: 3-->no apparent problem  Blu Score: 19                          Labs:   Recent Labs   Lab 09/28/21  0654 09/27/21  1513 09/27/21  1513   ALBUMIN 2.2*   < > 2.4*   PREALB  --   --  16   HGB 10.8*   < > 12.9*   INR 1.17*  --   --    WBC 5.3   < > 7.1   A1C  --   --  6.5*   CRP 30.0*   < > 31.0*    < > = values in this interval not displayed.       Physical Exam  Wound Location:  Left medial ankle        Date of last photo  9/28/21  Wound History:  In 1/26/21, had angiogram showing widely patent left peroneal, left posterior tibial artery long segment occlusion with distal reconstitution as well as high-grade multifocal stenoses of the proximal to mid left anterior tibial artery. Balloon angioplasty was unsuccessful on the posterior tibial but successful on the anterior tibial. Patient has strong doppler signals in DP and PT on the left. Arterial duplex from today with patent flow through anterior tibial and chronically occluded posterior tibial artery. Based on non-invasive studies, patient has adequate wound healing potential and would not benefit from revascularization at this time.    Has had NPWT on but has had problems with machines malfunctioning.  He does not remember what brand of NPWT he has been using. Currently with wet to moist dressing in place.  Home care has been coming out and changing dressings on [facility]    Wound Base: 90 % moist red granulation, 10% exposed tendon      Palpation of the wound bed: normal      Drainage: small     Description of drainage: serosanguinous     Measurements (length x width x depth, in cm) 16.5 x 7.4 x 1 cm      Tunneling N/A     Undermining up to 1 cm from 11 o'clock  Periwound skin: intact scar tissue      Color: normal and consistent with surrounding tissue      Temperature: normal   Odor: none  Pain: mild,   Pain intervention prior to dressing change: none necessary     Interventions  Visual inspection and assessment completed   Wound Care Rationale Promote moist wound healing without tissue dehydration  and Provide protection of tendon  Wound Care: done per plan of care  Supplies: gathered hydrogel impregnated gauze   Current off-loading measures: Pillows  Current support surface: Standard  Atmos Air mattress  Education provided to: plan of care, wound progress and Infection prevention   Discussed plan of care with Patient and Nurse

## 2021-09-28 NOTE — PLAN OF CARE
Son slept on/off throughout the night, stated that he is very tired. Sent urine, MRSA and wound cultures during the night. Noted small amount of bloody drainage from his L leg wound. Has been voiding without difficulty. Spot check finger stick was 101. Son stated that he did not check his sugars or take insulin at home. PIV with remainder of his vitamin bag infusing and when complete LR is to start at 75cc/hr. Did start Zosyn 4.5grms IV q 6hrs. Son denies pain to his legs, but does endorse numbness. Noted significant amount of dry skin flaking off his R foot, so I used warm earlene wipes to clean it and remove a lot of sluffing skin and debride between his toes. Did not scant amount of blood between great toe and next toe as well as the toenail on his great toe seems as if it may fall off. May could benefit from a podiatry consult. Will continue to monitor and report any significant changes.

## 2021-09-28 NOTE — PROVIDER NOTIFICATION
7a [id number] steven ayon u put a blood urine sodium, order was accidently deleted thx.   [phone] RN

## 2021-09-28 NOTE — CONSULTS
Vascular Surgery Consultation Note  [facility] ABHINAV Mahoney MRN# [id number]   Age: 51 year old YOB: 1970     Date of Admission:  9/27/2021    Reason for consult: LLE wound; options for revascularization       Requesting physician: Dr. Cifuentes       Level of consult: Consult, follow and place orders           Assessment and Recommendations   51M hx T2DM (recent A1c 6.5) c/b neuropathy, PAD, EtOH abuse, HTN, chronic LLE wound (beginning 1/2021) c/b sepsis, multiple debridements at outside facilities who presents for further evaluation and management of his chronic left foot wound. In 1/26/21, had angiogram showing widely patent left peroneal, left posterior tibial artery long segment occlusion with distal reconstitution as well as high-grade multifocal stenoses of the proximal to mid left anterior tibial artery. Balloon angioplasty was unsuccessful on the posterior tibial but successful on the anterior tibial. Patient has strong doppler signals in DP and PT on the left. The wound shows evidence of granulation tissue formation. Arterial duplex from today with patent flow through anterior tibial and chronically occluded posterior tibial artery. Based on non-invasive studies, patient has adequate wound healing potential and would not benefit from revascularization at this time.      - No acute surgical intervention  - Continue local wound cares; Eucerin lotion to dry, cracked skin on bilateral feet  - Optimize nutritional status for wound healing  - No vascular follow up warranted at this time  - Vascular surgery will sign off. Please call with additional questions/concerns.     Seen and discussed with fellow and staff.     Eber Swann MD  Surgery PGY-2          History of Present Illness:   CC: LLE wound     History is obtained from the patient    51M hx T2DM (recent A1c 6.5) c/b neuropathy, PAD, EtOH abuse, HTN, chronic LLE wound (beginning 1/2021) c/b sepsis,  multiple debridements at outside facilities who presents for further evaluation and management of his chronic left foot wound.    Wound began in 1/2021 after minor tissue trauma to his medial left ankle. This was complicated by cellulitis which progressed to group A strep bacteremia requiring debridement. Angiogram performed at that time showing widely patent left peroneal, left posterior tibial artery long segment occlusion with distal reconstitution as well as high-grade multifocal stenoses of the proximal to mid left anterior tibial artery. Balloon angioplasty was unsuccessful on the posterior tibial but successful on the anterior tibial. In February, had podiatry evaluation and recommended local wound cares as well as IV ceftriaxone 2/5-3/17. Eventual wound VAC placement which was changed at regular intervals until recently.     Presented to the hospital for further wound evaluation. Cultures obtained upon presentation. Evaluated by plastic surgery as well. Denies significant pain in and around wound. Able to wiggle toes, flex/extend at the ankle. Denies current fevers, chills. Strong DP/PT signals on the left, palpable popliteal pulse. Wet to dry dressing replaced.           Past Medical History:   T2DM  PAD  EtOH abuse  HTN          Past Surgical History:   Balloon angioplasty 1/2021 to left anterior tibial and posterior tibial artery  No other pertinent vascular surgical history          Social History:     Social History     Socioeconomic History     Marital status: Patient Declined     Spouse name: Not on file     Number of children: Not on file     Years of education: Not on file     Highest education level: Not on file   Occupational History     Not on file   Tobacco Use     Smoking status: Never Smoker     Smokeless tobacco: Never Used   Substance and Sexual Activity     Alcohol use: Yes     Drug use: Not Currently     Sexual activity: Not on file   Other Topics Concern     Not on file   Social History  "Narrative     Not on file     Social Determinants of Health     Financial Resource Strain:      Difficulty of Paying Living Expenses:    Food Insecurity:      Worried About Running Out of Food in the Last Year:      Ran Out of Food in the Last Year:    Transportation Needs:      Lack of Transportation (Medical):      Lack of Transportation (Non-Medical):    Physical Activity:      Days of Exercise per Week:      Minutes of Exercise per Session:    Stress:      Feeling of Stress :    Social Connections:      Frequency of Communication with Friends and Family:      Frequency of Social Gatherings with Friends and Family:      Attends Yazidi Services:      Active Member of Clubs or Organizations:      Attends Club or Organization Meetings:      Marital Status:    Intimate Partner Violence:      Fear of Current or Ex-Partner:      Emotionally Abused:      Physically Abused:      Sexually Abused:              Family History:   History reviewed. No pertinent family history.          Allergies:    No Known Allergies          Medications:   No current facility-administered medications on file prior to encounter.  cephALEXin (KEFLEX) 500 MG capsule, Take 500 mg by mouth 2 times daily  levofloxacin (LEVAQUIN) 750 MG tablet, Take 750 mg by mouth daily  Wound Cleansers (VASHE WOUND THERAPY) SOLN, USE 3 TIMES DAILY FOR WET OR DRY DRESSING CHANGES AS DIRECTED              Review of Systems:      All other review of systems negative, except for what is mentioned above        Physical Exam:   BP (!) 144/94 (BP Location: Left arm)   Pulse 81   Temp 98.2  F (36.8  C) (Oral)   Resp 16   Ht 1.905 m (6' 3\")   Wt 75.8 kg (167 lb 3.2 oz)   SpO2 100%   BMI 20.90 kg/m    General: Alert, interactive, NAD  Resp: nonlabored on room air  Cardiac: Regular rate and rhythm  Abdomen: Soft, nontender, nondistended. +BS  Extremities:              Left: see photo below of LLE wound, granulation tissue present, edges clean, multiphasic DP/PT " Doppler signals present             Right: dry, cracked skin noted on right foot extending to the mid shin, multiphasic DP/PT Doppler signals present  Skin: Dry, cracked skin on feet bilaterally  Neuro: A&Ox3, CN 2-12 intact, PETER              Data:     Results for orders placed or performed during the hospital encounter of 09/27/21 (from the past 24 hour(s))   CBC with platelets differential    Narrative    The following orders were created for panel order CBC with platelets differential.  Procedure                               Abnormality         Status                     ---------                               -----------         ------                     CBC with platelets and d...[[id number]]  Abnormal            Final result                 Please view results for these tests on the individual orders.   Comprehensive metabolic panel   Result Value Ref Range    Sodium 135 133 - 144 mmol/L    Potassium 3.8 3.4 - 5.3 mmol/L    Chloride 101 94 - 109 mmol/L    Carbon Dioxide (CO2) 24 20 - 32 mmol/L    Anion Gap 10 3 - 14 mmol/L    Urea Nitrogen 21 7 - 30 mg/dL    Creatinine 1.75 (H) 0.66 - 1.25 mg/dL    Calcium 8.8 8.5 - 10.1 mg/dL    Glucose 111 (H) 70 - 99 mg/dL    Alkaline Phosphatase 142 40 - 150 U/L    AST 98 (H) 0 - 45 U/L    ALT 48 0 - 70 U/L    Protein Total 8.2 6.8 - 8.8 g/dL    Albumin 2.4 (L) 3.4 - 5.0 g/dL    Bilirubin Total 0.8 0.2 - 1.3 mg/dL    GFR Estimate 44 (L) >60 mL/min/1.73m2   CRP inflammation   Result Value Ref Range    CRP Inflammation 31.0 (H) 0.0 - 8.0 mg/L   Blood Culture Peripheral Blood    Specimen: Peripheral Blood   Result Value Ref Range    Culture No growth after 12 hours    CBC with platelets and differential   Result Value Ref Range    WBC Count 7.1 4.0 - 11.0 10e3/uL    RBC Count 4.19 (L) 4.40 - 5.90 10e6/uL    Hemoglobin 12.9 (L) 13.3 - 17.7 g/dL    Hematocrit 38.8 (L) 40.0 - 53.0 %    MCV 93 78 - 100 fL    MCH 30.8 26.5 - 33.0 pg    MCHC 33.2 31.5 - 36.5 g/dL    RDW 14.5 10.0  - 15.0 %    Platelet Count 206 150 - 450 10e3/uL    % Neutrophils 78 %    % Lymphocytes 10 %    % Monocytes 10 %    % Eosinophils 1 %    % Basophils 0 %    % Immature Granulocytes 1 %    NRBCs per 100 WBC 0 <1 /100    Absolute Neutrophils 5.5 1.6 - 8.3 10e3/uL    Absolute Lymphocytes 0.7 (L) 0.8 - 5.3 10e3/uL    Absolute Monocytes 0.7 0.0 - 1.3 10e3/uL    Absolute Eosinophils 0.1 0.0 - 0.7 10e3/uL    Absolute Basophils 0.0 0.0 - 0.2 10e3/uL    Absolute Immature Granulocytes 0.1 (H) <=0.0 10e3/uL    Absolute NRBCs 0.0 10e3/uL   Phosphorus   Result Value Ref Range    Phosphorus 3.3 2.5 - 4.5 mg/dL   Hemoglobin A1c   Result Value Ref Range    Hemoglobin A1C 6.5 (H) 0.0 - 5.6 %   TSH with free T4 reflex   Result Value Ref Range    TSH 1.37 0.40 - 4.00 mU/L   Magnesium   Result Value Ref Range    Magnesium 1.5 (L) 1.6 - 2.3 mg/dL   Prealbumin   Result Value Ref Range    Prealbumin 16 15 - 45 mg/dL   Erythrocyte sedimentation rate auto   Result Value Ref Range    Erythrocyte Sedimentation Rate 54 (H) 0 - 20 mm/hr   Blood Culture Arm, Left    Specimen: Arm, Left; Blood   Result Value Ref Range    Culture No growth after 12 hours    Asymptomatic COVID-19 Virus (Coronavirus) by PCR Nasopharyngeal    Specimen: Nasopharyngeal; Swab   Result Value Ref Range    SARS CoV2 PCR Negative Negative    Narrative    Testing was performed using the Xpert Xpress SARS-CoV-2 Assay on the  Cepheid Gene-Xpert Instrument Systems. Additional information about  this Emergency Use Authorization (EUA) assay can be found via the Lab  Guide. This test should be ordered for the detection of SARS-CoV-2 in  individuals who meet SARS-CoV-2 clinical and/or epidemiological  criteria. Test performance is unknown in asymptomatic patients. This  test is for in vitro diagnostic use under the FDA EUA for  laboratories certified under CLIA to perform high complexity testing.  This test has not been FDA cleared or approved. A negative result  does not rule out  the presence of PCR inhibitors in the specimen or  target RNA in concentration below the limit of detection for the  assay. The possibility of a false negative should be considered if  the patient's recent exposure or clinical presentation suggests  COVID-19. This test was validated by the [facility]. This laboratory is certified under  the Clinical Laboratory Improvement Amendments of 1988 (CLIA-88) as  qualified to perform high complexity laboratory testing.     XR Ankle Port Left G/E 3 Views    Narrative    EXAM: XR ANKLE PORT LEFT G/E 3 VIEWS  LOCATION: [facility]  DATE/TIME: 9/27/2021 9:01 PM    INDICATION: Left medial ankle wound, status post abscess IND.  COMPARISON: None.      Impression    IMPRESSION: Diffuse osteopenia with diabetic vascular calcifications. The osteopenia is somewhat more focal in the medial malleolus which is where the ulcer is, and this could be the result of osteomyelitis. This is a difficult determination due to   patchy osteomyelitis throughout. MRI may be helpful for confirmation if this would be clinically helpful.   Glucose by meter   Result Value Ref Range    GLUCOSE BY METER POCT 101 (H) 70 - 99 mg/dL   UA with Microscopic reflex to Culture    Specimen: Urine, Clean Catch   Result Value Ref Range    Color Urine Light Yellow Colorless, Straw, Light Yellow, Yellow    Appearance Urine Clear Clear    Glucose Urine Negative Negative mg/dL    Bilirubin Urine Negative Negative    Ketones Urine 20  (A) Negative mg/dL    Specific Gravity Urine 1.027 1.003 - 1.035    Blood Urine Trace (A) Negative    pH Urine 5.5 5.0 - 7.0    Protein Albumin Urine 50  (A) Negative mg/dL    Urobilinogen Urine Normal Normal, 2.0 mg/dL    Nitrite Urine Negative Negative    Leukocyte Esterase Urine Negative Negative    Mucus Urine Present (A) None Seen /LPF    RBC Urine 1 <=2 /HPF    WBC Urine 3 <=5 /HPF    Squamous  Epithelials Urine <1 <=1 /HPF    Granular Casts Urine 1 (H) None Seen /LPF    Narrative    Urine Culture not indicated   Fractional excretion of sodium    Narrative    The following orders were created for panel order Fractional excretion of sodium.  Procedure                               Abnormality         Status                     ---------                               -----------         ------                     Fractional Excretion of ...[[id number]]                      Final result               Sodium[[id number]]                                                                      Creatinine, serum[[id number]]                                                             Please view results for these tests on the individual orders.   Fractional Excretion of Sodium   Result Value Ref Range    %FENA 0.8 %    Sodium Urine mmol/L 100 mmol/L    Creatinine Urine mg/dL 166 mg/dL   MRSA MSSA PCR, Nasal Swab    Specimen: Nares, Bilateral; Swab   Result Value Ref Range    MRSA Target DNA Negative Negative    SA Target DNA Positive     Narrative    The [employer]  Xpert SA Nasal Complete assay performed in the [employer]  Dx System is a qualitative in vitro diagnostic test designed for rapid detection of Staphylococcus aureus (SA) and methicillin-resistant Staphylococcus aureus (MRSA) from nasal swabs in patients at risk for nasal colonization. The test utilizes automated real-time polymerase chain reaction (PCR) to detect MRSA/SA DNA. The Xpert SA Nasal Complete assay is intended to aid in the prevention and control of MRSA/SA infections in healthcare settings. The assay is not intended to diagnose, guide or monitor treatment for MRSA/SA infections, or provide results of susceptibility to methicillin. A negative result does not preclude MRSA/SA nasal colonization.    CBC with Platelets & Differential    Narrative    The following orders were created for panel order CBC with Platelets & Differential.  Procedure                                Abnormality         Status                     ---------                               -----------         ------                     CBC with platelets and d...[[id number]]  Abnormal            Final result                 Please view results for these tests on the individual orders.   Magnesium   Result Value Ref Range    Magnesium 1.5 (L) 1.6 - 2.3 mg/dL   Comprehensive metabolic panel   Result Value Ref Range    Sodium 138 133 - 144 mmol/L    Potassium 3.4 3.4 - 5.3 mmol/L    Chloride 108 94 - 109 mmol/L    Carbon Dioxide (CO2) 23 20 - 32 mmol/L    Anion Gap 7 3 - 14 mmol/L    Urea Nitrogen 18 7 - 30 mg/dL    Creatinine 1.08 0.66 - 1.25 mg/dL    Calcium 8.0 (L) 8.5 - 10.1 mg/dL    Glucose 74 70 - 99 mg/dL    Alkaline Phosphatase 113 40 - 150 U/L    AST 65 (H) 0 - 45 U/L    ALT 37 0 - 70 U/L    Protein Total 7.3 6.8 - 8.8 g/dL    Albumin 2.2 (L) 3.4 - 5.0 g/dL    Bilirubin Total 0.8 0.2 - 1.3 mg/dL    GFR Estimate 79 >60 mL/min/1.73m2   Phosphorus   Result Value Ref Range    Phosphorus 3.2 2.5 - 4.5 mg/dL   INR   Result Value Ref Range    INR 1.17 (H) 0.85 - 1.15   Partial thromboplastin time   Result Value Ref Range    aPTT 37 22 - 38 Seconds   CRP inflammation   Result Value Ref Range    CRP Inflammation 30.0 (H) 0.0 - 8.0 mg/L   CBC with platelets and differential   Result Value Ref Range    WBC Count 5.3 4.0 - 11.0 10e3/uL    RBC Count 3.47 (L) 4.40 - 5.90 10e6/uL    Hemoglobin 10.8 (L) 13.3 - 17.7 g/dL    Hematocrit 32.6 (L) 40.0 - 53.0 %    MCV 94 78 - 100 fL    MCH 31.1 26.5 - 33.0 pg    MCHC 33.1 31.5 - 36.5 g/dL    RDW 14.7 10.0 - 15.0 %    Platelet Count 165 150 - 450 10e3/uL    % Neutrophils 66 %    % Lymphocytes 16 %    % Monocytes 13 %    % Eosinophils 2 %    % Basophils 1 %    % Immature Granulocytes 2 %    NRBCs per 100 WBC 0 <1 /100    Absolute Neutrophils 3.5 1.6 - 8.3 10e3/uL    Absolute Lymphocytes 0.9 0.8 - 5.3 10e3/uL    Absolute Monocytes 0.7 0.0 - 1.3 10e3/uL    Absolute  Eosinophils 0.1 0.0 - 0.7 10e3/uL    Absolute Basophils 0.0 0.0 - 0.2 10e3/uL    Absolute Immature Granulocytes 0.1 (H) <=0.0 10e3/uL    Absolute NRBCs 0.0 10e3/uL   Glucose by meter   Result Value Ref Range    GLUCOSE BY METER POCT 84 70 - 99 mg/dL   US EMILY with PPG wo Exercise    Narrative    RESTING EMILY AND 1ST DIGIT PPG 9/28/2021 11:45 AM    CLINICAL HISTORY: PAD with non-healing wound.    COMPARISONS: None available.    REFERRING PROVIDER: [employer]    TECHNIQUE: Bilateral EMILY, TBI, 1st Toe PPG obtained.    FINDINGS:  RIGHT:       Brachial: 136 mmHg       Ankle (PT): > 254 mmHg       Ankle (DP): > 254 mmHg       1st digit: 49 mmHg         EMILY: N/A (noncompressible)       TBI: 0.36    1st Toe PPG: normal     LEFT:       Brachial: 135 mmHg       Ankle (PT): Not obtained due to large wound       Ankle (DP): Not obtained due to large wound      1st Digit: 57 mmHg         EMILY: N/A       TBI: 0.42    1st Toe PPG: normal       Impression    IMPRESSION:  1. RIGHT:       A. noncompressible arteries.       B. TBI is 0.36, which is abnormal.      2. LEFT:       A. EMILY could not be obtained due to presence of large wound.       B. TBI is 0.42, which is abnormal.     Guidelines:    EMILY Diagnostic Criteria (Based on criteria published in Circulation  2011; 124: 5002-1969):    > 1.4: Non compressible    1.00 - 1.40: Normal    0.91 - 0.99: Borderline    At or below 0.90: Abnormal    EMILY Diagnostic Criteria (Based on ACC/AHA guideline 2008):    >/=1.3 - non compressible vessels    1.00  -1.29 - Normal    0.91 - 0.99 - Borderline    0.41 - 0.90 - Mild to moderate PAD    0.00 - 0.40 - Severe PAD   US Lower Extremity Arterial Duplex Bilateral    Narrative    BILATERAL LOWER EXTREMITY DUPLEX ARTERIAL ULTRASOUND 9/28/2021 11:54  AM    CLINICAL HISTORY: PAD with non healing wound.    COMPARISONS: None.    REFERRING PROVIDER: [employer]    TECHNIQUE: Grayscale, color Doppler, Doppler waveform ultrasound  evaluation of bilateral external  iliac arteries through anterior and  posterior tibial arteries.    FINDINGS:  RIGHT:       COMMON FEMORAL ARTERY: 99/12 cm/s, triphasic       PROFUNDUS FEMORAL ARTERY: 60 cm/s, triphasic         SUPERFICIAL FEMORAL ARTERY, proximal: 85/9 cm/s, triphasic       SUPERFICIAL FEMORAL ARTERY, mid: 73/4 cm/s, triphasic       SUPERFICIAL FEMORAL ARTERY, distal: 49/7 cm/s, monophasic         POPLITEAL ARTERY, proximal: 57/14 cm/s, monophasic         POSTERIOR TIBIAL ARTERY, ankle: 45/12 cm/s, monophasic       ANTERIOR TIBIAL ARTERY, ankle: 60/10 cm/s, monophasic    LEFT:       COMMON FEMORAL ARTERY: 87/5 cm/s, triphasic       PROFUNDUS FEMORAL ARTERY: 77 cm/s, triphasic         SUPERFICIAL FEMORAL ARTERY, proximal: 62/10 cm/s, triphasic         SUPERFICIAL FEMORAL ARTERY, mid: 58/6 cm/s, monophasic       SUPERFICIAL FEMORAL ARTERY, distal: 77/10 cm/s, monophasic         POPLITEAL ARTERY, proximal: 67/11 cm/s, monophasic         POSTERIOR TIBIAL ARTERY, ankle: Not well seen in the setting of  overlying large wound. Heavily calcified walls. No flow demonstrated.       ANTERIOR TIBIAL ARTERY, ankle: 108/16 cm/s, monophasic      Impression    IMPRESSION:  1. RIGHT: Patent arteries. Monophasic waveforms in the distal SFA and  distally, suggesting a degree of stenosis.     2. LEFT: Monophasic waveforms in the mid SFA and distally, suggesting  a degree of stenosis. No flow demonstrated within the posterior tibial  artery at the ankle adjacent to the large wound, likely occluded.       Guidelines:  [facility] duplex criteria for lower limb arterial  occlusive disease  -Percent stenosis- Normal (1-19%): Peak systolic velocity (cm/s):  <150, End-diastolic velocity (cm/s): <40, Velocity ratio (Vr): <1.5,  Distal arterial waveform: Triphasic  -Percent stenosis- 20-49%: Peak systolic velocity (cm/s): 150-200,  End-diastolic velocity (cm/s): <40, Velocity ratio (Vr): 1.5-2.0,  Distal arterial waveform: Triphasic  -Percent  stenosis- 50-75%: Peak systolic velocity (cm/s): 200-300,  End-diastolic velocity (cm/s): <90, Velocity ratio (Vr): 2.0-3.9,  Distal arterial waveform: Poststenotic turbulence distal to stenosis,  monophasic distal waveform  -Percent stenosis- >75%: Peak systolic velocity (cm/s): >300,  End-diastolic velocity (cm/s): <90, Velocity ratio (Vr): >4.0, Distal  arterial waveform: Dampened distal waveform and low PSV/EDV* in the  stenosis  -Percent stenosis- Occlusion: Absent flow by color Doppler/pulsed  Doppler spectral analysis; length of occlusion estimated from distance  between exit and reentry collateral arteries  *PSV = peak systolic velocity, EDV = end-diastolic velocity  http://link.quijano.com/chapter/[email or web address]/fulltext  html   Podiatry IP Consult: Patient to be seen: Routine - within 24 hours; Call back #: [id number]; Pt w/ severe PAD, non-healing ankle wound, also with thickened malformd nails and skin sloughing. Eval for any management inpt.; Consultant may enter orders: Y...    Marti Zayas PA-C     9/28/2021  1:56 PM  Brief Orthopedic Surgery Note    Orthopedics/podiatry was consulted again today for chronic   nonhealing ankle wound as well as thickened nails.    Orthopedics saw the patient yesterday for consultation.  Please   refer to full consultation note for details.  Will defer to   vascular surgery as well as plastic surgery for further   management of his wound.    In regards to thickened toenails.  I did discuss with the primary   team that our podiatrist rounds on Thursdays for nail cares.  Our   podiatrist will see the patient on Thursday, 9/30 for nail care   as if the patient remains inpatient at that time.    Please page orthopedic surgery with any questions or concerns.    MARTI MIRANDA PA-C  9/28/2021 1:55 PM  Orthopaedic Surgery     Thank you for allowing me to participate in this patient's care.   Please page me directly any questions/concerns.    Securely message with the Vocera Web Console (learn more here)  Text page via [employer] Paging/Directory    If there is no response, if it is a weekend, or if it is during   evening hours, please page the orthopaedic surgery resident on   call via [facility] Paging/Directory    Glucose by meter   Result Value Ref Range    GLUCOSE BY METER POCT 62 (L) 70 - 99 mg/dL

## 2021-09-28 NOTE — CONSULTS
Plastic Surgery Consult    Son Mahoney MRN# [id number]   YOB: 1970 Age: 51 year old      Date of Admission:  9/27/2021        Consult for:      Consulting physician/team: Nedra Cifuentes MD, Hospitalist team  Consulted regarding recommendations for LLE wound        Assessment/Plan:      51 year old male with hx of T2DM with neuropathy, PAD s/p LLE balloon angioplasty of the left anterior tibial artery and failed recanalization of the left distal posterior tibial artery, EtOH abuse, and HTN with chronic LLE wound inquiring about management. Wound appears non-contaminated with no visible bone or necrotic tissue. The Achilles tendon is visible. Arterial duplex continues to show posterior tibial artery occlusion adjacent to the wound. At this time, there is no immediate indication for management with flap reconstruction and additional comorbidities must be addressed before reconstruction.    - Optimize medically, HgbA1C < 7  - Ortho consult to discuss need for any further debridement with the patient if deemed necessary  - Optimize the patient from a vascular standpoint  - Can consider Integra wound dressing  - Nutritional support to improve patient's hypoalbuminemia  - Okay with continuing wound VAC; continue regular dressing changes while admitted  - We will see the patient in clinic when he is medically optimized to discuss reconstruction    Case discussed with Dr. Flaherty who agrees with the plan.    Rey Melara MD  Plastic Surgery PGY4           History of Present Illness:    Son Mahoney is a 51 year old male with hx of T2DM with neuropathy, PAD s/p intervention, EtOH abuse, and HTN for whom the Plastic Surgery team was consulted regarding a chronic LLE wound. Patient sustained an abrasion to the medial ankle on 1/20/21 and he was admitted to [facility] in [place] 1/24 for sepsis 2/2 cellulitis and myonecrosis of the soleus muscle with cellulitis and eschar of the left medial ankle.  He was readmitted 2/5-2/7 with concern for wound infection, at which time he was found to have distal LLE stenosis and occlusion on arterial duplex. He underwent LLE angiogram and balloon angioplasty with IR which improved flow to the anterior tibial artery but failed to improve posterior tibial artery flow. At this time, there was noted bone exposure. After discharge, he was followed by vascular surgery in [place] where the wound was managed with I&D, and wet-to-dry dressing changes. Patient was transitioned to Endoform and wound VAC with weekly monitoring but was lost to follow up until 8/4. At that time, all previously exposed bone had been covered with granulation tissue. He was referred to Dr. Rick for possible flap management of exposed Achilles tendon but was not seen in clinic.     He presented to [facility] 9/27 with increased wound drainage without associated pain. He has been nonweightbearing since 2/5/21 and uses a knee roller. He has been using Endoform and wound VAC for wound care. A large amount of venous blood with foul-smelling, necrotic material and copious drainage was noted on 9/27. Patient denies pain and has been taking Levaquin for a skin infection for the last few months. He denies changes to wound size or depth and has not noticed new visible structures in the wound. He denies tobacco use and drinks 1-1/2 pints of peppermint schnapps daily. He would like to avoid amputation and pursue management options with wound care.    Past Medical History:  History reviewed. No pertinent past medical history.    Past Surgical History:  History reviewed. No pertinent surgical history.    Allergies:   No Known Allergies    Medications:  No current facility-administered medications on file prior to encounter.  cephALEXin (KEFLEX) 500 MG capsule, Take 500 mg by mouth 2 times daily  levofloxacin (LEVAQUIN) 750 MG tablet, Take 750 mg by mouth daily  Wound Cleansers (VASHE WOUND THERAPY) SOLN, USE 3 TIMES  "DAILY FOR WET OR DRY DRESSING CHANGES AS DIRECTED        Social History:  Social History     Socioeconomic History     Marital status: Patient Declined     Spouse name: Not on file     Number of children: Not on file     Years of education: Not on file     Highest education level: Not on file   Occupational History     Not on file   Tobacco Use     Smoking status: Never Smoker     Smokeless tobacco: Never Used   Substance and Sexual Activity     Alcohol use: Yes     Drug use: Not Currently     Sexual activity: Not on file   Other Topics Concern     Not on file   Social History Narrative     Not on file     Social Determinants of Health     Financial Resource Strain:      Difficulty of Paying Living Expenses:    Food Insecurity:      Worried About Running Out of Food in the Last Year:      Ran Out of Food in the Last Year:    Transportation Needs:      Lack of Transportation (Medical):      Lack of Transportation (Non-Medical):    Physical Activity:      Days of Exercise per Week:      Minutes of Exercise per Session:    Stress:      Feeling of Stress :    Social Connections:      Frequency of Communication with Friends and Family:      Frequency of Social Gatherings with Friends and Family:      Attends Restorationism Services:      Active Member of Clubs or Organizations:      Attends Club or Organization Meetings:      Marital Status:    Intimate Partner Violence:      Fear of Current or Ex-Partner:      Emotionally Abused:      Physically Abused:      Sexually Abused:        Family History:  History reviewed. No pertinent family history.    ROS:  REVIEW OF SYSTEMS    The remainder of the complete ROS was negative unless noted in the HPI.    Exam:  BP (!) 144/94 (BP Location: Left arm)   Pulse 81   Temp 98.2  F (36.8  C) (Oral)   Resp 16   Ht 1.905 m (6' 3\")   Wt 75.8 kg (167 lb 3.2 oz)   SpO2 100%   BMI 20.90 kg/m    General: Alert and oriented with appropriate responses to questions, in NAD.  HEENT: NC/AT, " sclera anicteric, PERRL, EOMI  Resp: Non-labored on room air  Wound:~20 cm wound extending from the left medial malleolus to the mid calf. No visible purulence or necrotic tissue. Wound appears clean with moderate granulation tissue. Achilles tendon visible through the posterior aspect of wound but no visualized bone.  No TTP or pain with manipulation of the extremity. Skin distal to wound dry and cracking but appears intact and adequately perfused.   CV: LLE DP pulse palpable; no palpable LLE PT pulse. Extremities warm and well-perfused.  Neuro: Non-focal, sensation intact over regions of the sural, deep peroneal, and tibial nerves    Labs/ imaging:    LLE Arterial Duplex US 9/28/2021  IMPRESSION:  1. RIGHT: Patent arteries. Monophasic waveforms in the distal SFA and  distally, suggesting a degree of stenosis.     2. LEFT: Monophasic waveforms in the mid SFA and distally, suggesting  a degree of stenosis. No flow demonstrated within the posterior tibial  artery at the ankle adjacent to the large wound, likely occluded.     XR Ankle 9/27/2021  IMPRESSION: Diffuse osteopenia with diabetic vascular calcifications. The osteopenia is somewhat more focal in the medial malleolus which is where the ulcer is, and this could be the result of osteomyelitis. This is a difficult determination due to   patchy osteomyelitis throughout. MRI may be helpful for confirmation if this would be clinically helpful.     CBC RESULTS: Recent Labs   Lab Test 09/28/21  0654   WBC 5.3   RBC 3.47*   HGB 10.8*   HCT 32.6*   MCV 94   MCH 31.1   MCHC 33.1   RDW 14.7         Ref. Range 9/28/2021 06:54   INR Latest Ref Range: 0.85 - 1.15  1.17 (H)   PTT Latest Ref Range: 22 - 38 Seconds 37      Ref. Range 9/27/2021 15:13 9/28/2021 05:12 9/28/2021 06:54   Albumin Latest Ref Range: 3.4 - 5.0 g/dL 2.4 (L)  2.2 (L)   Prealbumin Latest Ref Range: 15 - 45 mg/dL 16     Protein Total Latest Ref Range: 6.8 - 8.8 g/dL 8.2  7.3   Bilirubin Total Latest  Ref Range: 0.2 - 1.3 mg/dL 0.8  0.8   Alkaline Phosphatase Latest Ref Range: 40 - 150 U/L 142  113   ALT Latest Ref Range: 0 - 70 U/L 48  37   AST Latest Ref Range: 0 - 45 U/L 98 (H)  65 (H)

## 2021-09-28 NOTE — CONSULTS
"[facility]  ORTHOPAEDIC SURGERY CONSULT - HISTORY AND PHYSICAL    DATE OF CONSULT: 9/27/2021 7:34 PM    REQUESTING PROVIDER: Rosi Cantu MD,- [facility] Staff.    CC: chronic left foot/ankle infection      HISTORY OF PRESENT ILLNESS:   The orthopaedic surgery service was consulted by Rosi Gudino MD for evaluation and treatment recommendations of chronic left foot/ankle infection.    Son Mahoney is a 51 year old male who has a history of peripheral vascular disease, DM II, HTN who presented to the ED today after an appointment with his vascular surgeon who has been following him for this chronic left lower extremity wound for the past 8-9 months.    Per chart review, \"he was seen by a Dr. Salvador Trotter (vascular surgery, [place]), who reported that the patient was seen for evaluation of left medial ankle wound that started as an abscess identified on January 24 of this year.  The patient was initially seen in a local emergency department and was transferred to [facility] in [facility].  Since that time, the patient has had the wound debrided and treated with wet-to-dry dressing that was transition to endoform and wound VAC.  The patient was directed to follow-up in clinic on a weekly basis, but was not compliant with this.  Prior to today, the patient was reportedly last seen on 8/4/2021.  Per report, at that time, all previously exposed bone was covered with granulation tissue, however there was a large area of Achilles tendon exposed.  The patient was referred to a plastic surgeon at that time for consultation on flap closure of the area to preserve the tendon.  His physician reportedly talked to Dr. Di Rick at [facility] plastic surgery, but the patient never made an appointment with this provider.  The patient came in for reevaluation today reporting increased drainage from the wound.  No significant associated pain.  The patient has been using " "endoform and negative pressure at the site.  The patient has been nonweightbearing and is using a knee roller.  On exam today, left lower extremity ulceration was noted with left medial portion of the wound with overlying granulation tissue and necrotic material/maceration around the edges of the wound.  A bulging was noted at the superior aspect of the wound, which was incised and debrided, with bloody/foul-smelling material drained from this area.  However, it was thought that additional treatment would be needed and that the patient could not be discharged home at this time.  As a result, he was referred here for presumed admission to the hospital and further treatment.  The physician who saw him in clinic today did think that his leg is salvageable at this time\"    Patient currently not complaining of any pain and would rate it a 1/10. He reports taking levofloxacin for the past few months. He is not sure what organism he is being treated for. It is his understanding that his leg is salvageable and he is being admitted for management of his infection.    Blood cultures drawn in the ED.    Denies numbness, tingling, or weakness to the affected extremities.  Denies fevers, chills, nausea, vomiting, diarrhea, constipation, chest pain, shortness of breath.    PAST MEDICAL HISTORY: See above    Patient denies any personal or family history of bleeding disorders, clotting disorders, or adverse reactions to anesthesia.    PAST SURGICAL HISTORY: none   History reviewed. No pertinent surgical history.    MEDICATIONS:   Anticoagulants: none    Prior to Admission medications    Medication Sig Last Dose Taking? Auth Provider   cephALEXin (KEFLEX) 500 MG capsule Take 500 mg by mouth 2 times daily 9/27/2021 at Unknown time Yes Reported, Patient   levofloxacin (LEVAQUIN) 750 MG tablet Take 750 mg by mouth daily 9/27/2021 at Unknown time Yes Reported, Patient   Wound Cleansers (VASHE WOUND THERAPY) SOLN USE 3 TIMES DAILY FOR " WET OR DRY DRESSING CHANGES AS DIRECTED 9/27/2021 at Unknown time Yes Reported, Patient       ALLERGIES:   Patient has no known allergies.    SOCIAL HISTORY:   Social History     Socioeconomic History     Marital status: Patient Declined     Spouse name: Not on file     Number of children: Not on file     Years of education: Not on file     Highest education level: Not on file   Occupational History     Not on file   Tobacco Use     Smoking status: Never Smoker     Smokeless tobacco: Never Used   Substance and Sexual Activity     Alcohol use: Yes     Drug use: Not Currently     Sexual activity: Not on file   Other Topics Concern     Not on file   Social History Narrative     Not on file     Social Determinants of Health     Financial Resource Strain:      Difficulty of Paying Living Expenses:    Food Insecurity:      Worried About Running Out of Food in the Last Year:      Ran Out of Food in the Last Year:    Transportation Needs:      Lack of Transportation (Medical):      Lack of Transportation (Non-Medical):    Physical Activity:      Days of Exercise per Week:      Minutes of Exercise per Session:    Stress:      Feeling of Stress :    Social Connections:      Frequency of Communication with Friends and Family:      Frequency of Social Gatherings with Friends and Family:      Attends Rastafarian Services:      Active Member of Clubs or Organizations:      Attends Club or Organization Meetings:      Marital Status:    Intimate Partner Violence:      Fear of Current or Ex-Partner:      Emotionally Abused:      Physically Abused:      Sexually Abused:      Living situation: Patient lives in a house with his two kids, brother and mother.  Occupation: Not currently employed  Tobacco: denies  Alcohol: has tried quitting, notes drinking 1 1/2 pints of peppermint schnapps  Illicit Drugs: denies    FAMILY HISTORY:  History reviewed. No pertinent family history.    Patient denies known family history of bleeding, clotting,  or anesthesia related complications.     REVIEW OF SYSTEMS:   Otherwise a 10-point reviews of systems was negative except as noted above in the HPI.     PHYSICAL EXAM:   Vitals:    09/27/21 1745 09/27/21 1800 09/27/21 1815 09/27/21 1830   BP: 118/74 119/80 128/78 125/72   Pulse: 97 110 107 105   Resp: 16   16   Temp:       TempSrc:       SpO2: 100%      Weight:       Height:         General: Awake, alert, appropriate, following commands, NAD.  Neuro: EOM grossly intact  HEENT: Normal.   Lungs: Breathing comfortably and nonlabored, no wheezes or stridor noted.  Heart/Cardiovascular: Regular pulse, no peripheral cyanosis.    Right Lower Extremity:   - No gross deformity, skin intact.  - No significant tenderness to palpation over thigh, knee, leg, ankle, foot, toes.  - No pain with ROM hip, knee, ankle.   - Fires quad, hamstrings, TA, GSC, EHL, FHL.  - Sensation difficult to interpret due to inconsistent responses during exam when unable to visualize exam, most consistently not reporting sensation in DPN distribution  - DP pulse dopplerable, toes warm and well perfused.      Left Lower Extremity:   - See attached photo for wound. No visible collections of pus/fluid. Relatively clean wound base without purulence.   - No pain when removing packing or dressing. No significant tenderness to palpation over thigh, knee, leg, ankle, foot, toes.  - No pain with ROM hip, knee, ankle.   - Fires quad, hamstrings, TA, GSC, EHL, FHL.  - Sensation difficult to interpret due to inconsistent responses during exam when unable to visualize exam, most consistently not reporting sensation in DPN distribution  - DP pulse dopplerable, toes warm and well perfused.           LABS:  Hemoglobin   Date Value Ref Range Status   09/27/2021 12.9 (L) 13.3 - 17.7 g/dL Final     WBC Count   Date Value Ref Range Status   09/27/2021 7.1 4.0 - 11.0 10e3/uL Final     Platelet Count   Date Value Ref Range Status   09/27/2021 206 150 - 450 10e3/uL Final      No results found for: INR  Creatinine   Date Value Ref Range Status   09/27/2021 1.75 (H) 0.66 - 1.25 mg/dL Final     Glucose   Date Value Ref Range Status   09/27/2021 111 (H) 70 - 99 mg/dL Final       IMAGING:  All imaging independently reviewed.    X-rays of the left ankle: patchy areas of osteopenic bone, consistent with osteomyelitis; no fracture or dislocation     IMPRESSION:   Son Mahoney is a 51 year old male PMH peripheral vascular disease, DM II, HTN with chronic left medial ankle wound and likely underlying osteomyelitis. Patient's wound recently incised, drained, and debrided. Given patient's exam there is currently no need for operative intervention. We recommend daily dressing and packing changes, as well as WOC consultation. Regarding plastic surgery consultation, we will leave it to the discretion of the primary team to determine if this admission would be appropriate. Also, regarding likely osteomyelitis, MRI would likely not change clinical treatment plan as patient is currently being treated with IV antibiotics.    RECOMMENDATIONS:   - Plan for OR: none  - Anticoagulation/DVT Prophylaxis: per primary  - Antibiotics/Tetanus: per primary  - X-rays/Imaging: complete  - Activity: up ad heri  - Weight bearing: NWB LLE  - Pain control: per primary.  - Diet: ok for diet from ortho perspective  - Follow-up: Follow up with current vascular surgeon    Assessment and Plan will be discussed with Dr. Ha, Orthopaedic Surgery Staff.       Orthopaedics will follow this patient peripherally at this time. Please call or page me or the on-call resident with any concerns or questions.    --  Mateo Lorenzo MD  Orthopedic Surgery PGY-1  [phone]    Please page me directly via [facility] with any questions/concerns during regular weekday hours before 7pm. If there is no response, if it is a weekend, or if it is during evening hours, then please page the orthopedic surgery resident on call.

## 2021-09-28 NOTE — PROGRESS NOTES
09/28/21 0921   Quick Adds   Type of Visit Initial PT Evaluation       Present no   Living Environment   People in home child(allyson), dependent  (10 yo; 12 yo)   Current Living Arrangements house   Home Accessibility wheelchair accessible;stairs within home   Number of Stairs, Within Home, Primary 4   Stair Railings, Within Home, Primary railings on both sides of stairs   Transportation Anticipated family or friend will provide;health plan transportation   Living Environment Comments Pt lives with his 2 children. Reports SO visits 4-5x/wk for assist with ADLs and family assist with IADLs. Has all needs on main level. Laundry in basement but report not needing to go downstairs. Pt reports walk-in shower with shower bench/chair but no grab bars.   Self-Care   Usual Activity Tolerance moderate   Current Activity Tolerance fair   Regular Exercise Yes   Activity/Exercise Type walking   Exercise Amount/Frequency 3-5 times/wk   Equipment Currently Used at Home walker, rolling;tub bench;other (see comments)  (scooter)   Activity/Exercise/Self-Care Comment Requires assistance for ADLs, IADLs, and transportation   Disability/Function   Hearing Difficulty or Deaf no   Wear Glasses or Blind yes   Vision Management glasses   Concentrating, Remembering or Making Decisions Difficulty no   Difficulty Communicating no   Difficulty Eating/Swallowing no   Walking or Climbing Stairs Difficulty yes   Walking or Climbing Stairs ambulation difficulty, requires equipment;stair climbing difficulty, requires equipment   Mobility Management Hop on RLE with 4WW; scooter for long distances   Dressing/Bathing Difficulty yes   Toileting issues no   Doing Errands Independently Difficulty (such as shopping) yes   Errands Management Family and SO runs errands   Fall history within last six months yes   Number of times patient has fallen within last six months 3   Change in Functional Status Since Onset of Current  "Illness/Injury yes   General Information   Onset of Illness/Injury or Date of Surgery 09/27/21   Referring Physician Ashley Bui MD   Patient/Family Therapy Goals Statement (PT) to go home; get stronger   Pertinent History of Current Problem (include personal factors and/or comorbidities that impact the POC) Per chart: \"Son is a 51 year old male admitted on 9/27/2021. He has a history of chronic left foot wound (onset 1/24/21, prior GAS SSTI), PAD, DMT2 (complicated by diabetic neuropathy), AUD, and HTN.  He is admitted for further evaluation and management of suspected diabetic foot wound infection with possible osteomyelitis and additionally  observing for withdrawal.\"   Existing Precautions/Restrictions fall;weight bearing   Weight-Bearing Status - LLE nonweight-bearing   General Observations Activity: Non weight bearing Left leg, up with assist   Cognition   Orientation Status (Cognition) oriented x 4   Pain Assessment   Patient Currently in Pain Yes, see Vital Sign flowsheet   Posture    Posture Protracted shoulders;Forward head position;Kyphosis   Posture Comments B shoulder elevation   Range of Motion (ROM)   ROM Quick Adds ROM WFL   Strength   Manual Muscle Testing Quick Adds Strength WFL   Bed Mobility   Comment (Bed Mobility) SBA   Transfers   Transfer Safety Comments 4WW + CGA pivot transfers   Gait/Stairs (Locomotion)   Comment (Gait/Stairs) 4WW + CGA for gait   Balance   Balance Comments static + dynamic standing balance impaired: NWB of LLE + 4WW for mobility   Clinical Impression   Criteria for Skilled Therapeutic Intervention yes, treatment indicated   PT Diagnosis (PT) Impaired functional mobility   Influenced by the following impairments chronic LLE wound, NWB of LLE, pain, general muscle weakness, impaired balance, endurance deficits   Functional limitations due to impairments bed mobility, transfers, gait, stairs   Clinical Presentation Stable/Uncomplicated   Clinical Presentation Rationale " Clinical judgment and chart review   Clinical Decision Making (Complexity) low complexity   Therapy Frequency (PT) 5x/week   Predicted Duration of Therapy Intervention (days/wks) weeks   Planned Therapy Interventions (PT) balance training;bed mobility training;gait training;patient/family education;stair training;strengthening;transfer training;risk factor education;neuromuscular re-education   Anticipated Equipment Needs at Discharge (PT) toileting equipment;walker, standard;shower chair   Risk & Benefits of therapy have been explained evaluation/treatment results reviewed;care plan/treatment goals reviewed;risks/benefits reviewed;current/potential barriers reviewed;participants voiced agreement with care plan;participants included;patient   PT Discharge Planning    PT Discharge Recommendation (DC Rec) home with home care physical therapy   PT Rationale for DC Rec Pt below baseline. Anticipate potential to progress to baseline. Will benefit from further skill therapy to optimize functional mobility and IND when medically stable to go home. Pt has good support system from family members and SO who can provide assistance for all ADLs, IADLs, and transportation. Pt has ramp to access home.   PT Brief overview of current status  Ax1 FWW   Total Evaluation Time   Total Evaluation Time (Minutes) 5

## 2021-09-28 NOTE — PLAN OF CARE
Time of Nursing Care:7:00 AM-7:30 PM  Date of Admission:9/27/2021  /Reason for Admission: Foot Ulcer  Procedure:Consults, Iv antibiotics,Neuro: A&Ox4.   Cardiac: Afebrile, VSS.   Respiratory: RA   GI/: Voiding spontaneously. Had a BM this shift.   Diet/appetite: Tolerating regular diet. On achs blood sugar. Hypoglycemic this morning, corrected with apple juice and 4 packets of sugar. Needs encouragement to eat. Denies nausea   Activity: Up with one assist, walker. No weight bearing on left foot.  Pain: Denies   Skin: Left leg wound wound care completed twice today. Now hs order placed by [facility]CRN    Lines: Right PVI.  Drains:None    No new complaints.Pt has been resting comfortably throughout this shift. Plan:will continue to monitor and follow plan of care. Update provider/s with change/concern/question

## 2021-09-28 NOTE — PHARMACY-VANCOMYCIN DOSING SERVICE
"Pharmacy Vancomycin Note  Date of Service 2021  Patient's  1970  51 year old, male    Indication: Osteomyelitis and Sepsis Rule-Out (pending cultures)     Current estimated CrCl = Estimated Creatinine Clearance: 86.8 mL/min (based on SCr of 1.08 mg/dL).    Creatinine for last 3 days  2021:  3:13 PM Creatinine 1.75 mg/dL  2021:  6:54 AM Creatinine 1.08 mg/dL    Recent Vancomycin Level(s) for last 3 days  No results found for requested labs within last 72 hours.      Vancomycin IV Administrations (past 72 hours)                   vancomycin (VANCOCIN) 1,750 mg in sodium chloride 0.9 % 500 mL intermittent infusion (mg) 1,750 mg New Bag 21 1838                Nephrotoxins and other renal medications (From now, onward)    Start     Dose/Rate Route Frequency Ordered Stop    21 0000  piperacillin-tazobactam (ZOSYN) 4.5 g vial to attach to  mL bag     Note to Pharmacy: For [facility], [facility] and [facility]: For Zosyn-naive patients, use the \"Zosyn initial dose + extended infusion\" order panel.    4.5 g  over 30 Minutes Intravenous EVERY 6 HOURS 21 1733  vancomycin place tavera - receiving intermittent dosing      1 each Intravenous SEE ADMIN INSTRUCTIONS 21 1733            Contrast Orders - past 72 hours (72h ago, onward)    None          Loading dose: 1750mg IV x1   Regimen: 1000 mg IV every 12 hours.  Exposure target: AUC24 (range)400-600 mg/L.hr   AUC24,ss: 567 mg/L.hr  Probability of AUC24 > 400: 83 %  Ctrough,ss: 18.3 mg/L  Probability of Ctrough,ss > 20: 42 %  Probability of nephrotoxicity (Lodise JENNIFER ): 15 %        Plan:  1. Start vancomycin  1000 mg IV q12h. Given tenuous renal function, will give 2 doses and recheck level before 3rd maintenance dose.   2. Vancomycin monitoring method: AUC  3. Vancomycin therapeutic monitoring goal: 400-600 mg*h/L  4. Pharmacy will check vancomycin levels as appropriate in 1-3 Days.    5. Serum creatinine levels " will be ordered daily for the first week of therapy and at least twice weekly for subsequent weeks.      TRACEY OLSON RPH

## 2021-09-28 NOTE — PLAN OF CARE
"BP (!) 171/96   Pulse 105   Temp 98.4  F (36.9  C) (Oral)   Resp 16   Ht 1.905 m (6' 3\")   Wt 75.8 kg (167 lb 3.2 oz)   SpO2 99%   BMI 20.90 kg/m      Pt got to floor at 2248.   VS: need to recheck BP was stable while in ED. Slightly tachy.   Denies pain and nausea.   Neuro: WDL  Resp: WDL  Cardiac: Tachy, low 100s  Skin: skin note completed. LLE wound covered with dressing - continue to assess need for dressing change. R foot dry.   Mobility: Assist x1, with walker. Jumps around, but able to move fairly well.    Plan: continue to monitor and notify the team with any changes.     Handoff given to following RN.    "

## 2021-09-28 NOTE — CONSULTS
Brief Orthopedic Surgery Note    Orthopedics/podiatry was consulted again today for chronic nonhealing ankle wound as well as thickened nails.    Orthopedics saw the patient yesterday for consultation.  Please refer to full consultation note for details.  Will defer to vascular surgery as well as plastic surgery for further management of his wound.    In regards to thickened toenails.  I did discuss with the primary team that our podiatrist rounds on Thursdays for nail cares.  Our podiatrist will see the patient on Thursday, 9/30 for nail care as if the patient remains inpatient at that time.    Please page orthopedic surgery with any questions or concerns.    MARTI MIRANDA PA-C  9/28/2021 1:55 PM  Orthopaedic Surgery     Thank you for allowing me to participate in this patient's care. Please page me directly any questions/concerns.   Securely message with the Vocera Web Console (learn more here)  Text page via [email or web address] Paging/Directory    If there is no response, if it is a weekend, or if it is during evening hours, please page the orthopaedic surgery resident on call via [email or web address] Paging/Directory

## 2021-09-29 ENCOUNTER — APPOINTMENT (OUTPATIENT)
Dept: PHYSICAL THERAPY | Facility: CLINIC | Age: 51
End: 2021-09-29
Payer: MEDICAID

## 2021-09-29 LAB
ALBUMIN SERPL-MCNC: 2.2 G/DL (ref 3.4–5)
ALP SERPL-CCNC: 103 U/L (ref 40–150)
ALT SERPL W P-5'-P-CCNC: 39 U/L (ref 0–70)
ANION GAP SERPL CALCULATED.3IONS-SCNC: 6 MMOL/L (ref 3–14)
AST SERPL W P-5'-P-CCNC: 64 U/L (ref 0–45)
BASOPHILS # BLD AUTO: 0 10E3/UL (ref 0–0.2)
BASOPHILS NFR BLD AUTO: 1 %
BILIRUB SERPL-MCNC: 0.7 MG/DL (ref 0.2–1.3)
BUN SERPL-MCNC: 9 MG/DL (ref 7–30)
CALCIUM SERPL-MCNC: 8.2 MG/DL (ref 8.5–10.1)
CHLORIDE BLD-SCNC: 108 MMOL/L (ref 94–109)
CO2 SERPL-SCNC: 26 MMOL/L (ref 20–32)
CREAT SERPL-MCNC: 0.92 MG/DL (ref 0.66–1.25)
EOSINOPHIL # BLD AUTO: 0.4 10E3/UL (ref 0–0.7)
EOSINOPHIL NFR BLD AUTO: 7 %
ERYTHROCYTE [DISTWIDTH] IN BLOOD BY AUTOMATED COUNT: 14.6 % (ref 10–15)
GFR SERPL CREATININE-BSD FRML MDRD: >90 ML/MIN/1.73M2
GLUCOSE BLD-MCNC: 88 MG/DL (ref 70–99)
GLUCOSE BLDC GLUCOMTR-MCNC: 148 MG/DL (ref 70–99)
GLUCOSE BLDC GLUCOMTR-MCNC: 180 MG/DL (ref 70–99)
GLUCOSE BLDC GLUCOMTR-MCNC: 78 MG/DL (ref 70–99)
GLUCOSE BLDC GLUCOMTR-MCNC: 80 MG/DL (ref 70–99)
GLUCOSE BLDC GLUCOMTR-MCNC: 93 MG/DL (ref 70–99)
HCT VFR BLD AUTO: 33.4 % (ref 40–53)
HGB BLD-MCNC: 11 G/DL (ref 13.3–17.7)
IMM GRANULOCYTES # BLD: 0.1 10E3/UL
IMM GRANULOCYTES NFR BLD: 2 %
LYMPHOCYTES # BLD AUTO: 0.9 10E3/UL (ref 0.8–5.3)
LYMPHOCYTES NFR BLD AUTO: 18 %
MAGNESIUM SERPL-MCNC: 1.8 MG/DL (ref 1.6–2.3)
MCH RBC QN AUTO: 31.4 PG (ref 26.5–33)
MCHC RBC AUTO-ENTMCNC: 32.9 G/DL (ref 31.5–36.5)
MCV RBC AUTO: 95 FL (ref 78–100)
MONOCYTES # BLD AUTO: 0.5 10E3/UL (ref 0–1.3)
MONOCYTES NFR BLD AUTO: 11 %
NEUTROPHILS # BLD AUTO: 3 10E3/UL (ref 1.6–8.3)
NEUTROPHILS NFR BLD AUTO: 61 %
NRBC # BLD AUTO: 0 10E3/UL
NRBC BLD AUTO-RTO: 0 /100
PHOSPHATE SERPL-MCNC: 2.9 MG/DL (ref 2.5–4.5)
PLATELET # BLD AUTO: 182 10E3/UL (ref 150–450)
POTASSIUM BLD-SCNC: 3.4 MMOL/L (ref 3.4–5.3)
POTASSIUM BLD-SCNC: 3.9 MMOL/L (ref 3.4–5.3)
PROT SERPL-MCNC: 7.7 G/DL (ref 6.8–8.8)
RBC # BLD AUTO: 3.5 10E6/UL (ref 4.4–5.9)
SODIUM SERPL-SCNC: 140 MMOL/L (ref 133–144)
VANCOMYCIN SERPL-MCNC: 16.3 MG/L
WBC # BLD AUTO: 4.9 10E3/UL (ref 4–11)

## 2021-09-29 PROCEDURE — 36415 COLL VENOUS BLD VENIPUNCTURE: CPT | Performed by: STUDENT IN AN ORGANIZED HEALTH CARE EDUCATION/TRAINING PROGRAM

## 2021-09-29 PROCEDURE — 250N000011 HC RX IP 250 OP 636: Performed by: STUDENT IN AN ORGANIZED HEALTH CARE EDUCATION/TRAINING PROGRAM

## 2021-09-29 PROCEDURE — 97116 GAIT TRAINING THERAPY: CPT | Mod: GP

## 2021-09-29 PROCEDURE — 85025 COMPLETE CBC W/AUTO DIFF WBC: CPT | Performed by: STUDENT IN AN ORGANIZED HEALTH CARE EDUCATION/TRAINING PROGRAM

## 2021-09-29 PROCEDURE — 99232 SBSQ HOSP IP/OBS MODERATE 35: CPT | Mod: GC | Performed by: STUDENT IN AN ORGANIZED HEALTH CARE EDUCATION/TRAINING PROGRAM

## 2021-09-29 PROCEDURE — 90791 PSYCH DIAGNOSTIC EVALUATION: CPT | Performed by: PSYCHOLOGIST

## 2021-09-29 PROCEDURE — 80202 ASSAY OF VANCOMYCIN: CPT | Performed by: STUDENT IN AN ORGANIZED HEALTH CARE EDUCATION/TRAINING PROGRAM

## 2021-09-29 PROCEDURE — 120N000011 HC R&B TRANSPLANT UMMC

## 2021-09-29 PROCEDURE — 87040 BLOOD CULTURE FOR BACTERIA: CPT | Performed by: STUDENT IN AN ORGANIZED HEALTH CARE EDUCATION/TRAINING PROGRAM

## 2021-09-29 PROCEDURE — 80053 COMPREHEN METABOLIC PANEL: CPT | Performed by: STUDENT IN AN ORGANIZED HEALTH CARE EDUCATION/TRAINING PROGRAM

## 2021-09-29 PROCEDURE — 84100 ASSAY OF PHOSPHORUS: CPT | Performed by: STUDENT IN AN ORGANIZED HEALTH CARE EDUCATION/TRAINING PROGRAM

## 2021-09-29 PROCEDURE — 99254 IP/OBS CNSLTJ NEW/EST MOD 60: CPT | Performed by: STUDENT IN AN ORGANIZED HEALTH CARE EDUCATION/TRAINING PROGRAM

## 2021-09-29 PROCEDURE — 250N000013 HC RX MED GY IP 250 OP 250 PS 637: Performed by: STUDENT IN AN ORGANIZED HEALTH CARE EDUCATION/TRAINING PROGRAM

## 2021-09-29 PROCEDURE — 83735 ASSAY OF MAGNESIUM: CPT | Performed by: STUDENT IN AN ORGANIZED HEALTH CARE EDUCATION/TRAINING PROGRAM

## 2021-09-29 PROCEDURE — 84132 ASSAY OF SERUM POTASSIUM: CPT | Performed by: STUDENT IN AN ORGANIZED HEALTH CARE EDUCATION/TRAINING PROGRAM

## 2021-09-29 PROCEDURE — 999N000111 HC STATISTIC OT IP EVAL DEFER

## 2021-09-29 PROCEDURE — 999N000128 HC STATISTIC PERIPHERAL IV START W/O US GUIDANCE

## 2021-09-29 RX ORDER — MEROPENEM 500 MG/1
500 INJECTION, POWDER, FOR SOLUTION INTRAVENOUS EVERY 8 HOURS
Status: DISCONTINUED | OUTPATIENT
Start: 2021-09-29 | End: 2021-09-29

## 2021-09-29 RX ORDER — LEVOFLOXACIN 750 MG/1
750 TABLET, FILM COATED ORAL DAILY
Status: DISCONTINUED | OUTPATIENT
Start: 2021-09-30 | End: 2021-09-30 | Stop reason: HOSPADM

## 2021-09-29 RX ORDER — ALBUTEROL SULFATE 90 UG/1
2 AEROSOL, METERED RESPIRATORY (INHALATION) EVERY 4 HOURS PRN
Status: DISCONTINUED | OUTPATIENT
Start: 2021-09-29 | End: 2021-09-30 | Stop reason: HOSPADM

## 2021-09-29 RX ORDER — POTASSIUM CHLORIDE 750 MG/1
40 TABLET, EXTENDED RELEASE ORAL ONCE
Status: COMPLETED | OUTPATIENT
Start: 2021-09-29 | End: 2021-09-29

## 2021-09-29 RX ADMIN — PIPERACILLIN SODIUM AND TAZOBACTAM SODIUM 4.5 G: 4; .5 INJECTION, POWDER, LYOPHILIZED, FOR SOLUTION INTRAVENOUS at 00:26

## 2021-09-29 RX ADMIN — FOLIC ACID 1 MG: 1 TABLET ORAL at 07:55

## 2021-09-29 RX ADMIN — Medication 400 MG: at 19:59

## 2021-09-29 RX ADMIN — Medication 400 MG: at 13:26

## 2021-09-29 RX ADMIN — PIPERACILLIN SODIUM AND TAZOBACTAM SODIUM 4.5 G: 4; .5 INJECTION, POWDER, LYOPHILIZED, FOR SOLUTION INTRAVENOUS at 06:37

## 2021-09-29 RX ADMIN — MEROPENEM 500 MG: 500 INJECTION, POWDER, FOR SOLUTION INTRAVENOUS at 19:16

## 2021-09-29 RX ADMIN — MEROPENEM 500 MG: 500 INJECTION, POWDER, FOR SOLUTION INTRAVENOUS at 11:48

## 2021-09-29 RX ADMIN — ALBUTEROL SULFATE 2 PUFF: 90 AEROSOL, METERED RESPIRATORY (INHALATION) at 19:59

## 2021-09-29 RX ADMIN — THIAMINE HCL TAB 100 MG 100 MG: 100 TAB at 07:55

## 2021-09-29 RX ADMIN — VANCOMYCIN HYDROCHLORIDE 1000 MG: 1 INJECTION, SOLUTION INTRAVENOUS at 00:27

## 2021-09-29 RX ADMIN — Medication 400 MG: at 07:55

## 2021-09-29 RX ADMIN — POTASSIUM CHLORIDE 40 MEQ: 750 TABLET, EXTENDED RELEASE ORAL at 14:57

## 2021-09-29 RX ADMIN — Medication 1 TABLET: at 07:55

## 2021-09-29 RX ADMIN — VANCOMYCIN HYDROCHLORIDE 1000 MG: 1 INJECTION, SOLUTION INTRAVENOUS at 13:22

## 2021-09-29 ASSESSMENT — ACTIVITIES OF DAILY LIVING (ADL)
ADLS_ACUITY_SCORE: 13
ADLS_ACUITY_SCORE: 14
ADLS_ACUITY_SCORE: 13
ADLS_ACUITY_SCORE: 11

## 2021-09-29 NOTE — PROGRESS NOTES
[id number]  Up with SBA and 4 wheeled walker NWB to left leg due to wound. Dressing CDI at this time. Denied pain or any other complaints. Down for MRI of left leg this evening to eval for osteomyelitis. Continues on IV antibiotics. CIWA score 0. Blood sugar completed late this evening due to being at MRI for an extended period.

## 2021-09-29 NOTE — CONSULTS
Health Psychology                      Inessa Brink, Ph.D., L.P [phone]  Ernestine Savage, Ph.D., L.P. [phone]  Nuvia Ospina, Ph.D. [phone]  Opal Rosales, Ph.D., L.P. [phone]  Roni Khan, Ph.D., A.B.P.P., L.P. [phone]         Tanisha Mckeon, Ph.D., L.P. [phone]     [facility], [address]rd [address]  [address] [place] [address]    Confidential Summary of Inpatient Health Psychology Consultation*    Date of Service:  09/29/21    Referring Provider:  Lindsey Spring DO    Reason for Consultation:  Concerns for depression in context of chronic right wound and alcohol use disorder    Sources of Information:  Information was obtained from a clinical interview with the patient and review of medical record.       History of Present Illness:  Son Mahoney is a 51 year old male who reported significant stress regarding chronic lower extremity wound.  He stated that his inability to use his leg to walk or function adequately has prevented him from being able to remain gainfully employed.  This is stressful as he has 2 children needs to support.  He reported intermittent feelings of depressed mood and anxiety.  However he is able to feel that his mood improves during pleasurable activities such as watching sports on television.  He also has had his anxiety alleviated by receiving medical treatment at the Center, particularly after care providers have developed a plan in order to not need to amputate his lower limb.  Endorses ongoing stress but high motivation to adequately self manage diabetes in order to improve wound healing, remain sober and take care of his physical and mental health.  He acknowledges that after his 2 sons have returned to school there is a lot of downtime during the day, during which he had an alcohol relapse recently.  However he reports intention to stay sober with no perceived need for chemical dependency treatment in  order to do so.  Anticipates discharge to home tomorrow and is looking forward to this.      Medical History:  See below lists for past medical history, past surgical history, and current medications    Past Medical History:   Diagnosis Date     Diabetes mellitus, type 2 (H)      PVD (peripheral vascular disease) (H)        History reviewed. No pertinent surgical history.    Current Facility-Administered Medications   Medication     acetaminophen (TYLENOL) tablet 650 mg    Or     acetaminophen (TYLENOL) Suppository 650 mg     glucose gel 15-30 g    Or     dextrose 50 % injection 25-50 mL    Or     glucagon injection 1 mg     diazepam (VALIUM) tablet 10 mg     folic acid (FOLVITE) tablet 1 mg     insulin aspart (NovoLOG) injection (RAPID ACTING)     insulin aspart (NovoLOG) injection (RAPID ACTING)     lidocaine (LMX4) cream     lidocaine 1 % 0.1-1 mL     magnesium oxide (MAG-OX) tablet 400 mg     melatonin tablet 1 mg     meropenem (MERREM) 500 mg vial to attach to  mL bag for ADULTS or 25 mL bag for PEDS     multivitamin w/minerals (THERA-VIT-M) tablet 1 tablet     sodium chloride (PF) 0.9% PF flush 3 mL     sodium chloride (PF) 0.9% PF flush 3 mL     thiamine (B-1) tablet 100 mg     vancomycin (VANCOCIN) 1000 mg in dextrose 5% 200 mL PREMIX         Psychiatric History:  He presents with a history of an alcohol use disorder in early remission.  Details of chemical dependency history detailed well on medical notes.  He reports no other psychiatric diagnosis or treatment including anxiety or depressive disorders.    Social History:  He lives in a home with his 2 children, ages 13 and 8.  Recently lost his job in  at a [employer].  He endorses stress regarding his ability to financially support his children.  Hopes to get back to work but unable to walk at this time due to chronic lower extremity wound.    Mental Status/Interview:  Appearance/Behavior/Orientation: Alert and oriented to person,  place, time, and situation. No evidence of psychomotor agitation.    Cooperation/Reliability: Patient appeared to honestly respond to questions about psychosocial functioning and is deemed a reliable historian.   Cognition/Memory/Judgment:  Not formally assessed, yet no difficulties apparent upon interview. Fund of knowledge consistent with age, level of education, and life experience. Abstract reasoning appropriate, no difficulties with judgment apparent.  Speech/Language: Speech was soft yet otherwise WNL.  Thought Content/Form: Appropriate to interview and situation. Overall logical and organized.   Mood/Affect:  Mood dysphoric mildly; affect was mood congruent.      Impression:  Pt presents with symptoms consistent with adjustment disorder with mixed anxiety and depressed mood.  He reports high motivation for self-management of diabetes in order to facilitate wound healing.  He looks forward to discharge to home to manage his illness to gain functioning in his leg.  However, he has behavioral several risk factors that may impair his self-management in the long-term which include significant financial strain, unemployment, illness and depression and anxiety in the context of an alcohol use disorder in early remission.    Diagnosis:  Adjustment disorder with mixed anxiety and depressed mood  Alcohol use disorder, in early remission    Recommendation/Plan:  I discussed the patient's interest with follow-up psychological care following discharge.  He stated he is primarily interested in meeting with someone as needed and did not find the need to proactively schedule at this time.  Discussed how he could work with his primary care provider to find a mental health resource or insurance network.  He also endorsed no need at this time to engage in alcohol use disorder treatment as he is highly motivated to remain sober to facilitate wound healing.  It would be recommended for his ongoing care team to continue to monitor  depression, anxiety and alcohol use in order to prevent behavioral factors worsening health in the future.     Opal Rosales, PhD,   Clinical Health Psychologist      *In accordance with the Rules of the [place] [employer], it is noted that psychological descriptions and scientific procedures underlying psychological evaluations have limitations.  Absolute predictions cannot be made based on information in this report.    This note was completed using Dragon voice recognition software.  Although reviewed after completion, some word and grammatical errors may occur.

## 2021-09-29 NOTE — PLAN OF CARE
"/81 (BP Location: Left arm)   Pulse 89   Temp 98.4  F (36.9  C) (Oral)   Resp 18   Ht 1.905 m (6' 3\")   Wt 75.8 kg (167 lb 3.2 oz)   SpO2 100%   BMI 20.90 kg/m      [id number]. AVSS on RA. BG 78 & 80. Denies pain and nausea. Tolerating a regular diet, fair oral intake. PIV w/ TKO. CIWA scoring 0. Voiding adequate amounts, not saving. Pt reports having a BM this afternoon. L wound dressing, CDI. Per pt was changed by MD's this morning at the bedside. Up with SBA + walker. NWB on L foot. Will continue to monitor and update with any changes.   "

## 2021-09-29 NOTE — DISCHARGE INSTRUCTIONS
[facility] Transportation Services:    Call [phone] to arrange transportation.  You will need to provide at least three business days  notice.  [facility] schedules and provides routine transportation Monday through Friday from 7 a.m. to 6 p.m. 3 business day s notice.  You will need your medical assistance #.

## 2021-09-29 NOTE — PLAN OF CARE
"[id number] Nursing Shift Report:  Son appeared to sleep soundly between cares, states that he is very tired. Spot check fingerstick 148. Continues with antibiotics IV, Zosyn and Vancomycin. Left lower extremity remains wrapped and elevated on 2 pillows. Denies  pain or discomfort, but states he very tired. CIWA scores continues to be \"0\". Will continue to monitor and report any significant changes.  "

## 2021-09-29 NOTE — CONSULTS
Care Management Follow Up    Length of Stay (days): 2    Expected Discharge Date: 10/01/2021     Concerns to be Addressed: discharge planning, care coordination/care conferences     Patient plan of care discussed at interdisciplinary rounds: Yes    Anticipated Discharge Disposition: Home with Home Care and DME     Anticipated Discharge Services: Pt applied for PCA services through [facility]  Anticipated Discharge DME: Wound Vac    Patient/family educated on Medicare website which has current facility and service quality ratings:  yes  Education Provided on the Discharge Plan: yes  Patient/Family in Agreement with the Plan: yes    Referrals Placed by CM/SW: External Care Coordination, Homecare  Private pay costs discussed: Not applicable    Additional Information:  SW was consulted for adjustment to illness counseling. CMA has already been completed by RNCC. SW met with patient at bedside who was open to meeting with SW. When SW brought up the consult, patient mentioned his alcohol use. He shared he had been drinking a pint to a pint and a half of hard alcohol per day. Patient shared his drinking got worse after his children went back to school. He reports being able to function and take care of his children even though he was drinking daily. His friends were bringing it to him as he was unable to drive due to his foot. He acknowledged that he can't drink anymore due to his foot and plans to get a new lock on his door as his friends often come without telling him. He reports having no more alcohol in the house and sober support in his girlfriend. SW asked if patient wanted any resources for treatment and he said no. He has done treatment and AA in the past and feels he can manage on his own. SW provided phone number if he had any further questions or need for resources.     SUSHMA Adrian, MAGALISW  7A   Ph: [phone]  Pager: [phone]

## 2021-09-29 NOTE — PROVIDER NOTIFICATION
Provider notified regarding pt medication request. Pt states he uses an albuterol inhaler at home for asthma.     Author is currently awaiting response and prescription placement.    A second page has been sent, still awaiting a response.    On-call contacted author, and an order has been placed.

## 2021-09-29 NOTE — PHARMACY-VANCOMYCIN DOSING SERVICE
Pharmacy Vancomycin Note  Date of Service 2021  Patient's  1970   51 year old, male    Indication: Osteomyelitis (Possible)   Day of Therapy: 3  Current vancomycin regimen:  1000 mg IV q12h  Current vancomycin monitoring method: AUC  Current vancomycin therapeutic monitoring goal: 400-600 mg*h/L    Current estimated CrCl = Estimated Creatinine Clearance: 101.8 mL/min (based on SCr of 0.92 mg/dL).    Creatinine for last 3 days  2021:  3:13 PM Creatinine 1.75 mg/dL  2021:  6:54 AM Creatinine 1.08 mg/dL  2021:  6:51 AM Creatinine 0.92 mg/dL    Recent Vancomycin Levels (past 3 days)  2021: 12:00 PM Vancomycin 16.3 mg/L    Vancomycin IV Administrations (past 72 hours)                   vancomycin (VANCOCIN) 1000 mg in dextrose 5% 200 mL PREMIX (mg) 1,000 mg New Bag 21 0027     1,000 mg New Bag 21 1712    vancomycin (VANCOCIN) 1,750 mg in sodium chloride 0.9 % 500 mL intermittent infusion (mg) 1,750 mg New Bag 21 1838                Nephrotoxins and other renal medications (From now, onward)    Start     Dose/Rate Route Frequency Ordered Stop    21 1200  vancomycin (VANCOCIN) 1000 mg in dextrose 5% 200 mL PREMIX      1,000 mg  200 mL/hr over 1 Hours Intravenous EVERY 12 HOURS 21 1108               Contrast Orders - past 72 hours (72h ago, onward)    Start     Dose/Rate Route Frequency Ordered Stop    21 2230  gadobutrol (GADAVIST) injection 7.5 mL      7.5 mL Intravenous ONCE 21 2224 21 2226          Interpretation of levels and current regimen:  Vancomycin level is reflective of -600    Has serum creatinine changed greater than 50% in last 72 hours: Yes    Urine output:  good urine output    Renal Function: Improving    Loading Dose: 1750 mg IV x1.   Regimen: 1000 mg IV every 12 hours.  Exposure target: AUC24 (range)400-600 mg/L.hr   AUC24,ss: 520 mg/L.hr  Probability of AUC24 > 400: 92 %  Ctrough,ss: 16.4 mg/L  Probability  of Ctrough,ss > 20: 23 %  Probability of nephrotoxicity (Lodise JENNIFER 2009): 12 %    Plan:  1. Continue Current Dose  2. Vancomycin monitoring method: AUC  3. Vancomycin therapeutic monitoring goal: 400-600 mg*h/L  4. Pharmacy will check vancomycin levels as appropriate in 1-3 Days.  5. Serum creatinine levels will be ordered daily for the first week of therapy and at least twice weekly for subsequent weeks.    TRACEY OLSON, IRVINH

## 2021-09-29 NOTE — CONSULTS
"  [facility] CONSULTATION     Patient:  Son Mahoney   Date of birth 1970, Medical record number [id number]  Date of Visit:  09/29/2021  Date of Admission: 9/27/2021  Consult Requester:Catalina Araujo DO          Assessment and Recommendations:   ASSESSMENT:  1. Chronic wound of left medial ankle with exposed Achilles tendon  - Initially presented 1/24/21 with S.pyogenes on culture  - VAC therapy  - Previous cultures (March-August 2021): C.acnes, MSSA, C.albicans, ESBL Klebsiella oxytoca, and GPB resembling Corynebacterium   2. Skin and soft tissue infection  3. Type II DM  4. PVD    DISCUSSION:   Son Mahoney is a 51 year old male with history of type II DM, peripheral arterial disease s/p vascular intervention 2/2021, alcohol use disorder, HTN, chronic left ankle wound (onset 1/24/21 with prior S.pyogenes infection s/p 6 weeks ceftriaxone) who presented to ED from vascular clinic on 9/27/21 for evaluation of left ankle wound. Multiple preivous cultures with MSSA and C.albicans- in setting of necrotic tissue without purulence, erythema, warmth, these organisms represent skin aleyda rather than active infection. Has been on Keflex and/or levofloxacin for multiple courses since 3/2021, most recently reports taking levofloxacin. Vascular surgeon performed bedside I&D on \"bulging\" area at superior portion of wound in clinic on 9/27, noted dark venous blood and foul smelling drainage; cultures sent to [facility]- called on 9/29 and thus far Staph aureus and other organisms pending identification. Superficial culture repeated at [facility] with S.aureus and blood cultures (9/27, 9/28, 9/29) negative to date. I&D on 9/27 likely achieved source control of active infection. Today, wound appears healthy without erythema, purulence, or foul smelling drainage. Based on review of outside notes and current presentation, recommend a short course of therapy for SSTI with levofloxacin and " Augmentin for 5 days to cover previously isolated organisms + anaerobes, then stop. Presence of large wound, even with areas of necrosis, does not require ongoing suppressive therapy.        RECOMMENDATION:  1. Stop meropenem  2. Stop vancomycin  3. Start Augmentin 875mg PO q12hrs x5 days  4. Start Levofloxacin 750mg PO daily x5 days  5. Needs ongoing wound cares, VAC therapy per vascular  6. ID will sign off at this time, please don't hesitate to contact the [facility] ID team should further questions arise.    Thank you for this consult. ID will continue to follow.     Patient was discussed with Dr. Lunsford.     Sandra James PA-C  Infectious Disease  Pager # [phone]  ________________________________________________________________    Consult Question: 52 y/o M w/ T2DM (controlled), PAD admitted with chronic, severe LLE wound. Working on discharge planning and appreciate recs for any abx?  Admission Diagnosis: Positive blood culture [R78.81]  Open wound of left knee, leg, and ankle, initial encounter [S81.002A, S81.802A, S91.002A]         History of Present Illness:     Son Mahoney is a 51 year old male with history of type II DM, peripheral arterial disease s/p vascular intervention 2/2021, alcohol use disorder, HTN, chronic left ankle wound (onset 1/24/21 with prior S.pyogenes infection s/p 6 weeks ceftriaxone) who presented to ED from vascular clinic on 9/27/21 for evaluation of left ankle wound.    Brief timeline:  - 1/24/2021: presented to ED with wound on medial left ankle from hitting on truck brake pedal. Initially with SSTI, eschar and possible myonecrosis of soleus muscle, drainage from wound with Strep pyogenes. Started on ceftriaxone.  - 2/5-2/7/21: Admitted due to concern for wound infection and found to have abscess with S.pyogenes. Was followed by vascular and ID. Podiatry offered option of amputation. Arterial duplex US of LLE showed distal stenosis and occlusion. IR performed LLE angiogram  with balloon angioplasty to the mid-anterior tibial artery, however the distal posterior tibial failed recanalization. Continued on ceftriaxone through 3/17.    - March-August 2021: followed with vascular surgeon, Dr. Trotter in [place], [place] (labs sent to [facility]). Several bedside debridements of necrotic tissue. Cultures consistently growing MSSA and cultures April-June with C.albicans as well. Started on wound VAC therapy over the summer with home health visits. Overall wound has been improving with previously exposed bone covered by granulation tissue, though portion of achilles tendon still exposed. Has been on multiple courses of Keflex and Levofloxacin since March 2021. Most recently reports taking Levofloxacin.        About 2-3 weeks ago he felt feverish, did not check temperature at home, and had chills. Associated with sore throat and congestion. These symptoms have completely resolved as of about 2 weeks ago. Otherwise has felt in his usual state of health. Has been on levofloxacin and keflex recently. Home health nurse comes to his home 3x/week for wound VAC changes. The nurse noted an area of swelling at top of the wound and recommended he go to clinic. He was seen in vascular clinic on 9/27 where the superior portion of the wound was noted to be bulging and bedside I&D was completed with drainage that appeared consistent with dark venous blood and was malodorous. He was sent to ED for further evaluation and plastic surgery consult.     Social hx: lives in [place] in single family home. 2 children live with him, sometimes brother and sometimes mother live there. Has a dog, keeps dog in the other room during vac changes so no direct contact with wound. Has not submerged wound. No recent gardening, yard work, hunting, fishing, outdoor freshwater exposure. 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 No tobacco use or IVDU, +alcohol use. Enjoys walking  around town, going to movies, being outside- has not been able to get around easily due to VAC.     Antimicrobials  Current:  - Meropenem (9/29-present)  - Vancomycin (9/27-present)    Prior:  - Zosyn (9/27-present)  - Levofloxacin (home med, unclear start)  - Keflex (home med, unclear start)         Review of Systems:   CONSTITUTIONAL:  No fevers or chills (resolved ~2 weeks)  EYES: negative for icterus  ENT:  negative for congestion and sore throat (resolved ~2 weeks)  RESPIRATORY:  negative for cough (dry cough resolved ~2 weeks) with sputum and dyspnea  CARDIOVASCULAR:  negative for edema, dyspnea  GASTROINTESTINAL:  negative for nausea, vomiting, diarrhea and constipation  GENITOURINARY:  negative for dysuria  MUSCULOSKELETAL:  No myalgias, arthralgia  INTEGUMENT:  +swelling by wound (resolved)negative for rash and pruritus  NEURO:  Negative for headache         Past Medical History:     Past Medical History:   Diagnosis Date     Diabetes mellitus, type 2 (H)      PVD (peripheral vascular disease) (H)             Past Surgical History:   History reviewed. No pertinent surgical history.         Family History:   Reviewed and non-contributory.   Family History   Problem Relation Age of Onset     Heart Disease Mother      Heart Disease Father             Social History:     Social History     Tobacco Use     Smoking status: Never Smoker     Smokeless tobacco: Never Used   Substance Use Topics     Alcohol use: Yes     History   Sexual Activity     Sexual activity: Not on file            Current Medications:       folic acid  1 mg Oral Daily     insulin aspart  1-3 Units Subcutaneous TID AC     insulin aspart  1-3 Units Subcutaneous At Bedtime     magnesium oxide  400 mg Oral TID     meropenem  500 mg Intravenous Q8H     multivitamin w/minerals  1 tablet Oral Daily     sodium chloride (PF)  3 mL Intracatheter Q8H     thiamine  100 mg Oral Daily     vancomycin  1,000 mg Intravenous Q12H            Allergies:   No  Known Allergies         Physical Exam:   Vitals were reviewed  Patient Vitals for the past 24 hrs:   BP Temp Temp src Pulse Resp SpO2   09/29/21 1412 120/72 98.5  F (36.9  C) Oral 94 18 100 %   09/29/21 1115 123/81 98.4  F (36.9  C) Oral 89 18 100 %   09/29/21 0728 139/86 98  F (36.7  C) Oral 78 18 99 %   09/29/21 0415 (!) 125/93 98.3  F (36.8  C) Oral 83 18 100 %   09/28/21 2323 116/81 98.5  F (36.9  C) Oral 86 18 99 %       Physical Examination:  GENERAL:  well-developed, well-nourished, in bed in no acute distress.   HEENT:  Head is normocephalic, atraumatic   EYES:  Eyes have anicteric sclerae without conjunctival injection.   ENT:  Oropharynx is moist without exudates or ulcers. Tongue is midline  NECK:  Supple.   LUNGS:  Clear to auscultation bilateral.   CARDIOVASCULAR:  RRR, +S1/S2, no murmur appreciated.  ABDOMEN:  Normal bowel sounds, soft, nondistended.  SKIN: Feet warm. No acute rashes.  Left ankle: large open wound on medial aspect of ankle, serosanguinous drainage on dressing, no malodor or purulence. Wound edges are non-erythematous, no surrounding swelling/fluctuance.   NEUROLOGIC:  Grossly nonfocal. Active x4 extremities         Laboratory Data:     Inflammatory Markers    Recent Labs   Lab Test 09/28/21  0654 09/27/21  1514 09/27/21  1513   SED  --  54*  --    CRP 30.0*  --  31.0*       Hematology Studies    Recent Labs   Lab Test 09/29/21  0651 09/28/21  0654 09/27/21  1513   WBC 4.9 5.3 7.1   HGB 11.0* 10.8* 12.9*   MCV 95 94 93    165 206       Metabolic Studies     Recent Labs   Lab Test 09/29/21  0651 09/28/21  0654 09/27/21  1513    138 135   POTASSIUM 3.4 3.4 3.8   CHLORIDE 108 108 101   CO2 26 23 24   BUN 9 18 21   CR 0.92 1.08 1.75*   GFRESTIMATED >90 79 44*       Hepatic Studies    Recent Labs   Lab Test 09/29/21  0651 09/28/21  0654 09/27/21  1513   BILITOTAL 0.7 0.8 0.8   ALKPHOS 103 113 142   ALBUMIN 2.2* 2.2* 2.4*   AST 64* 65* 98*   ALT 39 37 48        Microbiology:  Culture   Date Value Ref Range Status   09/28/2021 No growth after 1 day  Preliminary   09/28/2021 No growth after 1 day  Preliminary   09/28/2021 No anaerobic organisms isolated after 1 day  Preliminary   09/28/2021 Culture in progress  Preliminary   09/28/2021 1+ Staphylococcus aureus (A)  Preliminary   09/27/2021 No growth after 1 day  Preliminary   09/27/2021 No growth after 1 day  Preliminary             **MSSA with Same susceptibility pattern also cultured 5/28/21, 6/7/21      **C.albicans/yeast cultured 5/28/21, 6/7/21        Urine Studies    Recent Labs   Lab Test 09/28/21  0512   LEUKEST Negative   WBCU 3             Imaging:     MRI left ankle *over-read 9/29* (9/28/21)  CONCLUSION:  1.  I agree with the original report.  2.  Specifically, there is no evidence for osteomyelitis, septic arthropathy, or abscess.  3.  There is peroneus brevis and longus tendon tendinopathy with a segment of longitudinal partial-thickness split tearing of the brevis.  4.  There is severe posterior tibial tendon tendinopathy with additional partial-thickness tearing.  5.  There is additional tendinopathy within the flexor hallucis and flexor digitorum tendons.  6.  There is edema or cellulitis about the lower leg with soft tissue ulceration seen medially but no evidence for organized fluid collection.  7.  No evidence for fracture.    US Lower Extrem (9/28/21)  IMPRESSION:  1. RIGHT: Patent arteries. Monophasic waveforms in the distal SFA and  distally, suggesting hyperemia and/or trifurcation disease.  2. LEFT: Monophasic waveforms in the mid SFA and distally, suggesting  hyperemia distally as well as trifurcation disease. No flow  demonstrated within the posterior tibial artery at the ankle adjacent  to the large wound, likely occluded.     US EMILY with PPG w/o Exercise (9/28/21)  IMPRESSION:  1. RIGHT:       A. noncompressible arteries.       B. TBI is 0.36, which is abnormal, though a toe pressure of  49  mmHg is compatible with adequate wound healing potential.     2. LEFT:       A. EMILY could not be obtained due to presence of large wound.       B. TBI is 0.42, which is abnormal, though a total pressure of 57  mmHg is compatible with adequate healing potential.

## 2021-09-29 NOTE — PROGRESS NOTES
[facility]    Progress Note - Renu's Service        Date of Admission:  9/27/2021    Today  - ID consulted; switched from IV to oral ABX per ID recs  - Stop CIWA protocol  - Closely following wound and blood culture  - health psych  - assist in setting up [facility] Primary care follow up    Assessment & Plan           Son Mahoney is a 51 year old male admitted on 9/27/2021. He has hx of chronic left foot wound (onset 1/24/21, prior GAS SSTI), PAD, DM2 (complicated by diabetic neuropathy), AUD, and HTN.  He presents today for further evaluation and management of his chronic left foot wound.     #Stage 4 Wound Ulcer - secondary to diabetes and PAD  #PAD  Most likely related to DM2 and PAD; Concern for superimposed infection of soft tissues and possibly osteomyelitis.  Patient does not currently meet SIRS criteria.  He remains on IV vancomycin and Zosyn.  Wound culture obtained from wound site prior to initiation of broader antibiotics (patient previously on PO linezolid alone) pending. 9/28 USG lower extremity showing occluded posterior tibial artery; US EMILY showing non compressible R artery and R sided TBI 0.36 and L sided TBI of 0.42 - both legs showing adequate healing potential. Xray suggestive of osteopenia with possible osteomyelitis;   -Following blood cultures  -ID consulted regarding discharge ABX plan  -Start Augmentin and Levaquin X5 days (9/30-)  -Discontinue vancomycin   -Discontinued IV Zosyn 4.5 g due to concern for previous resistance  -Discontinue meropenam   -daily CBC, am CRP  -Tylenol as needed for pain  - Vascular surgery and plastic surgery consulted; no inpatient procedures indicated and recommend outpatient follow up  - podiatry to see patient tomorrow if not discharged yet. If not, recommend outpatient follow up    Antibiotics  Outside records of Antibiotics - Ceftriaxone 2g IV 1/24/2021 to 3/17/2021; cephalexin 500 mg 4 times day and Levofloxacin  750 in June 2021   Vancoymycin (9/25- 9/29)  Zosyn (9/25- 9/29)  Meropenam (9/29- 9/29 one dose)      Cultures at outside [facility]    3/24/21 WCx - No growth - probable contamination from cutibacterium acne    4/12/21 WCx - Staph aureus - sensitive to cefazolin, erythromycin, levo, tetra, bactrim and vanco    5/28 WCx - Staph aureus - sensitive to cefazolin, levo, tetra, bactrim    6/7 WCx- Staph aureus - sensitive to cefazolin, levo, tetra, bactrim    8/4/21 WCx: Klebsiella oxytoca (sensitive to bactrim, gentamycin, levofloxacin, ciprofloxacin, tobramycin, meropenem); Staph Aureus (sensitive to oxacillin, erythromycin, clindamycin, cefazolin, vancomycin, levofloxacin and Bactrim)    Earlier this week - WCx from outside [facility]- Growing 2+ g+ cocci; ID pending (we are following up on the results)    Cultures at [facility]    9/27 Bcx: NGTD    9/27 bcx: NGTD    9/28 Wcx: NGTD    9/28 Wcx: NGTD    9/28 Bcx: NGTD    9/28 Bcx: NGTD    #DM2  Patient hasn't been taking meds at home; Last visible Hgb A1c on 1/24/2021 is 10.4.  Here today A1c 6.5% likely from decreased oral intake since returning to alcohol use 6mo ago. TSH levels normal  -Continue Low-dose sliding scale insulin  - F/U with [facility] primary care at [place] for outpatient management    #Global muscle atrophy  #Severe malnutrition  On evaluation patient appears underweight.  He states he has not had intentional weight loss this year.  He denies any night sweats lymphadenopathy easy bruising or bleeding.  Currently suspect approximately 9 months nonweightbearing status combined with return to alcohol use about 6mo ago. Low albumin levels; Normal prealbumin level.  -PT/OT consulted, recommended home PT  -Nutrition services consulted who recommended meals three times a day    #HTN, at goal:   Patient is not currently on any medications for his blood pressure.  - Goal <180/110  - F/U with [facility] primary care at [place] for outpatient management    #AUD  Daily alcohol use for  several years (1-1.5 pints schnapps) without AV hallucinations, DTs, seizure activity, withdrawing from alcohol. CIWA score has been 0 since admission.  -Stop CIWA protocol  -Health psychology discussion; patient declining MAT or additional support.  -Magnesium and phosphorus optimization  -Daily folate Theravite and thiamine    #Hypomagnesemia, resolved  1.5 at admission. Repleted per RN protocol and wnl on 9/29.    #REMBERTO, resolved  Cr 1.8 on admission, back to baseline with IV fluids and po intake. Suspected pre-intrinsic causes given patient reported decreased PO intake recently.  9/28 FENA was 0.8.    Diet: Consistent Carb 75 grams CHO per Meal Diet  Snacks/Supplements Adult: Glucerna; Between Meals    DVT Prophylaxis: Low Risk/Ambulatory with no VTE prophylaxis indicated PADUA 3+ given limited mobility   Coello Catheter: Not present  Fluids: None  Central Lines: None  Code Status: Full Code      Disposition Plan   Expected discharge: Tomorrow recommended to home once antibiotic plan established.     The patient's care was discussed with the Patient and Primary team.    OTTO RAMOS  Medical Student  Renu's Service  [facility]  Securely message with the Vocera Web Console (learn more here)  Text page via [facility] Paging/Directory    I was present with the medical student who participated in the service and in the documentation of this note. I have verified the history and personally performed the physical exam and medical decision making. I have verified and edited the note, which accurately reflects my assessment of the patient and the plan of care.    Josi Miramontes, DO   she/her  PGY2       ______________________________________________________________________    Interval History   NAEO; no active complaints; continues to have no pain in the left wound; last BM yesterday morning    No chest pain, SOB, cough, nausea, vomiting, GI/ changes.     Is  "interested in having a PCP to coordinate cares and monitor chronic illness. He says that in the past, he has stopped seeing PCP because they \"just want to amputate\" my leg.    Data reviewed today: I reviewed all medications, new labs and imaging results over the last 24 hours.   Physical Exam   Vital Signs: Temp: 98.6  F (37  C) Temp src: Oral BP: 119/71 Pulse: 88   Resp: 18 SpO2: 97 % O2 Device: None (Room air)    Weight: 167 lbs 3.2 oz     General Appearance: NAD, pleasant, sitting up in the bed  Respiratory: No increased WOB, chest bilateral  Cardiovascular: RRR, S1,S2 normal, No MRG  GI: non distended, soft, non tender, no HSM, BS+  Skin: Warm and dry in the upper extremities, face, chest and abdomen ; Lower extremities: left sided wound [id number] wound on medial side from below calf to just below medial malleolus - non tender, erythematous base with white slough  Extremities: no pedal edema; DP + bilaterally, PT could not palpated L side; R side PT 1+  Psych: Nl mood, response and affect    Data   Recent Labs   Lab 09/29/21  1949 09/29/21  1825 09/29/21  1215 09/29/21  0900 09/29/21  0651 09/29/21  0218 09/28/21  0743 09/28/21  0654 09/28/21  0049 09/27/21  1513   WBC  --   --   --   --  4.9  --   --  5.3  --  7.1   HGB  --   --   --   --  11.0*  --   --  10.8*  --  12.9*   MCV  --   --   --   --  95  --   --  94  --  93   PLT  --   --   --   --  182  --   --  165  --  206   INR  --   --   --   --   --   --   --  1.17*  --   --    NA  --   --   --   --  140  --   --  138  --  135   POTASSIUM 3.9  --   --   --  3.4  --   --  3.4  --  3.8   CHLORIDE  --   --   --   --  108  --   --  108  --  101   CO2  --   --   --   --  26  --   --  23  --  24   BUN  --   --   --   --  9  --   --  18  --  21   CR  --   --   --   --  0.92  --   --  1.08  --  1.75*   ANIONGAP  --   --   --   --  6  --   --  7  --  10   ESE  --   --   --   --  8.2*  --   --  8.0*  --  8.8   GLC  --  93 80 78 88   < >   < > 74   < > 111*   ALBUMIN  " --   --   --   --  2.2*  --   --  2.2*  --  2.4*   PROTTOTAL  --   --   --   --  7.7  --   --  7.3  --  8.2   BILITOTAL  --   --   --   --  0.7  --   --  0.8  --  0.8   ALKPHOS  --   --   --   --  103  --   --  113  --  142   ALT  --   --   --   --  39  --   --  37  --  48   AST  --   --   --   --  64*  --   --  65*  --  98*    < > = values in this interval not displayed.     Recent Results (from the past 24 hour(s))   MR Ankle Left w/o & w Contrast    Narrative    FINAL REPORT    OVER READ BY DR. YOLANDA ONEIL AT 7:03 ON 09/29/2021    REPORT:    TENDONS: There is tendinopathy within the peroneus brevis and longus tendons without evidence for full-thickness tearing. There is a short segment of partial-thickness longitudinal split tearing of the brevis. No tenosynovitis. There is severe posterior   tibial tendon tendinopathy with some intrasubstance partial-thickness tearing. There is less advanced tendinopathy within the flexor digitorum tendon and flexor hallucis tendon without full-thickness tearing. The extensor tendons are negative.    LIGAMENTS: The anterior and posterior syndesmotic ligaments are intact. The anterior and posterior talofibular ligaments are intact. The deltoid ligamentous complex is intact. The calcaneofibular ligament is intact.    BONES AND SOFT TISSUES: There is no evidence for osteomyelitis. There is no evidence for fracture. There is no significant effusion to suggest a septic arthropathy. There is edema or cellulitis seen along the margins of the lower leg with soft tissue   irregularity and likely ulceration medially but no evidence for organized fluid collection or abscess.      Impression    CONCLUSION:  1.  I agree with the original report.  2.  Specifically, there is no evidence for osteomyelitis, septic arthropathy, or abscess.  3.  There is peroneus brevis and longus tendon tendinopathy with a segment of longitudinal partial-thickness split tearing of the brevis.  4.  There is  severe posterior tibial tendon tendinopathy with additional partial-thickness tearing.  5.  There is additional tendinopathy within the flexor hallucis and flexor digitorum tendons.  6.  There is edema or cellulitis about the lower leg with soft tissue ulceration seen medially but no evidence for organized fluid collection.  7.  No evidence for fracture.    Final Interpretation Dictated By: Kehinde Lugo MD  Date: 09/29/2021         PRELIMINARY REPORT    EXAM: MR ANKLE LEFT W/O and W CONTRAST  LOCATION: [facility]  DATE/TIME: 9/28/2021 9:50 PM    INDICATION: Chronic nonhealing ankle/foot wounds. Peripheral vascular disease.  COMPARISON: None.  TECHNIQUE: Routine. Additional postgadolinium T1 sequences were obtained.  IV CONTRAST: 7.5mL Gadavist    FINDINGS:     Images demonstrate no definite changes of osteomyelitis.  There is no soft tissue abscess.  Extensive edema involving medial aspect of ankle and foot. Edema through the intrinsic muscles of the foot.  There is also localized edema at the insertion of the tibialis posterior tendon.    IMPRESSION:  1.  Preliminary report. A final report will be rendered tomorrow morning.  2.  No marco antonio osteomyelitis. No soft tissue abscess.    Preliminary Interpretation Dictated By: Joe Dubon MD  Date: 9/28/2021 10:58 PM         Medications       [START ON 9/30/2021] amoxicillin-clavulanate  1 tablet Oral Q12H YULISSA     folic acid  1 mg Oral Daily     insulin aspart  1-3 Units Subcutaneous TID AC     insulin aspart  1-3 Units Subcutaneous At Bedtime     [START ON 9/30/2021] levofloxacin  750 mg Oral Daily     magnesium oxide  400 mg Oral TID     multivitamin w/minerals  1 tablet Oral Daily     sodium chloride (PF)  3 mL Intracatheter Q8H     thiamine  100 mg Oral Daily

## 2021-09-29 NOTE — PROGRESS NOTES
Care Management Follow Up    Length of Stay (days): 2    Expected Discharge Date: 09/30/2021     Concerns to be Addressed: discharge planning, care coordination/care conferences     Patient plan of care discussed at interdisciplinary rounds: Yes    Anticipated Discharge Disposition: Home, Home Care, DME     Anticipated Discharge Services:  (Pt applied for PCA services through [facility])  Anticipated Discharge DME: Wound Vac    Patient/family educated on Medicare website which has current facility and service quality ratings:    Education Provided on the Discharge Plan:    Patient/Family in Agreement with the Plan: yes    Referrals Placed by CM/SW: External Care Coordination, Homecare (Final plan pending cultures and wound care recommendations)  Additional Information:  Pt status reviewed/discussed during care team rounds.  Team anticipates possible discharge on Thursday 9/30 after Podiatry has seen patient.  Per provider team pt plans to establish care within [facility]. Pt noted concern with transportation.  Reviewed that writer has not been able to connect with home care RN to discuss discharge plans.    Met with pt.  Per discussion with pt his nurse Sara is through [facility] [phone].   Pt notes that he has trouble reaching Sara via phone, he leaves her messages and per pt she does come 3x per week to change vac dressing.  Discussed concern with wound vac.  Pt confirmed that his sister is going to call  Respiratory to arrange for a new vac to be sent to the patients home as per pt the vac he has at home has issues holding a charge.  Pt confirmed that he has wound vac dressing supplies at home.  Per pt his sister or his brother will be the ones transporting him home when he is cleared for discharge.  Reviewed [employer] program for patient.  Information [employer] placed on patients discharge instructions.      Spoke with [facility] who agreed to  send an email to Sara requesting she contact writer regarding patient.   Dawood transferred writer to Sara's cellphone.    Writer left a generic  requesting return call.     1220: Writeshaun received f/u call from EMILIA Ruiz with [facility].  Sara confirmed she has been providing wound vac dressing changes.  Sara requested writer call [facility] regarding fax #.  Reviewed that we anticipate pt will discharge tomorrow.    Spoke with Quentin and confirmed fax # [phone].    Discharge orders updated.     Lorna Pal RN BSN, PHN, ACM-RN  7A RN Care Coordinator  Phone: [phone]  Pager [phone]    9/29/2021 12:13 PM

## 2021-09-29 NOTE — PLAN OF CARE
OT/7A: OT defer. Per discussion with pt, pt with no acute OT needs at this time. Pt reports being able to complete all ADLs IND'ly at this time. Per PT, pt able to don shorts IND'ly in session. Pt also has good support system at home from family members and SO who are able to assist prn. PT will continue to follow while inpatient. OT will sign off. Please re-consult if additional needs arise during this hospital stay.

## 2021-09-30 ENCOUNTER — APPOINTMENT (OUTPATIENT)
Dept: PHYSICAL THERAPY | Facility: CLINIC | Age: 51
End: 2021-09-30
Payer: MEDICAID

## 2021-09-30 VITALS
OXYGEN SATURATION: 99 % | HEART RATE: 99 BPM | DIASTOLIC BLOOD PRESSURE: 77 MMHG | WEIGHT: 163 LBS | SYSTOLIC BLOOD PRESSURE: 120 MMHG | HEIGHT: 75 IN | RESPIRATION RATE: 18 BRPM | BODY MASS INDEX: 20.27 KG/M2 | TEMPERATURE: 98.4 F

## 2021-09-30 LAB
BASOPHILS # BLD AUTO: 0.1 10E3/UL (ref 0–0.2)
BASOPHILS NFR BLD AUTO: 1 %
CRP SERPL-MCNC: 20 MG/L (ref 0–8)
EOSINOPHIL # BLD AUTO: 0.3 10E3/UL (ref 0–0.7)
EOSINOPHIL NFR BLD AUTO: 6 %
ERYTHROCYTE [DISTWIDTH] IN BLOOD BY AUTOMATED COUNT: 14.7 % (ref 10–15)
GLUCOSE BLDC GLUCOMTR-MCNC: 100 MG/DL (ref 70–99)
GLUCOSE BLDC GLUCOMTR-MCNC: 125 MG/DL (ref 70–99)
HCT VFR BLD AUTO: 35.9 % (ref 40–53)
HGB BLD-MCNC: 11.4 G/DL (ref 13.3–17.7)
IMM GRANULOCYTES # BLD: 0.1 10E3/UL
IMM GRANULOCYTES NFR BLD: 2 %
LYMPHOCYTES # BLD AUTO: 0.9 10E3/UL (ref 0.8–5.3)
LYMPHOCYTES NFR BLD AUTO: 18 %
MCH RBC QN AUTO: 30.7 PG (ref 26.5–33)
MCHC RBC AUTO-ENTMCNC: 31.8 G/DL (ref 31.5–36.5)
MCV RBC AUTO: 97 FL (ref 78–100)
MONOCYTES # BLD AUTO: 0.7 10E3/UL (ref 0–1.3)
MONOCYTES NFR BLD AUTO: 13 %
NEUTROPHILS # BLD AUTO: 2.9 10E3/UL (ref 1.6–8.3)
NEUTROPHILS NFR BLD AUTO: 60 %
NRBC # BLD AUTO: 0 10E3/UL
NRBC BLD AUTO-RTO: 0 /100
PLATELET # BLD AUTO: 213 10E3/UL (ref 150–450)
RBC # BLD AUTO: 3.71 10E6/UL (ref 4.4–5.9)
WBC # BLD AUTO: 4.9 10E3/UL (ref 4–11)

## 2021-09-30 PROCEDURE — 85025 COMPLETE CBC W/AUTO DIFF WBC: CPT | Performed by: STUDENT IN AN ORGANIZED HEALTH CARE EDUCATION/TRAINING PROGRAM

## 2021-09-30 PROCEDURE — 36415 COLL VENOUS BLD VENIPUNCTURE: CPT | Performed by: STUDENT IN AN ORGANIZED HEALTH CARE EDUCATION/TRAINING PROGRAM

## 2021-09-30 PROCEDURE — 97116 GAIT TRAINING THERAPY: CPT | Mod: GP

## 2021-09-30 PROCEDURE — 0HBRXZZ EXCISION OF TOE NAIL, EXTERNAL APPROACH: ICD-10-PCS | Performed by: PODIATRIST

## 2021-09-30 PROCEDURE — 97530 THERAPEUTIC ACTIVITIES: CPT | Mod: GP

## 2021-09-30 PROCEDURE — 99238 HOSP IP/OBS DSCHRG MGMT 30/<: CPT | Mod: GC | Performed by: STUDENT IN AN ORGANIZED HEALTH CARE EDUCATION/TRAINING PROGRAM

## 2021-09-30 PROCEDURE — 250N000013 HC RX MED GY IP 250 OP 250 PS 637: Performed by: STUDENT IN AN ORGANIZED HEALTH CARE EDUCATION/TRAINING PROGRAM

## 2021-09-30 PROCEDURE — 99221 1ST HOSP IP/OBS SF/LOW 40: CPT | Performed by: PODIATRIST

## 2021-09-30 PROCEDURE — 86140 C-REACTIVE PROTEIN: CPT | Performed by: STUDENT IN AN ORGANIZED HEALTH CARE EDUCATION/TRAINING PROGRAM

## 2021-09-30 RX ORDER — LEVOFLOXACIN 750 MG/1
750 TABLET, FILM COATED ORAL DAILY
Qty: 4 TABLET | Refills: 0 | Status: SHIPPED | OUTPATIENT
Start: 2021-09-30 | End: 2021-10-04

## 2021-09-30 RX ORDER — MULTIPLE VITAMINS W/ MINERALS TAB 9MG-400MCG
1 TAB ORAL DAILY
Qty: 30 TABLET | Refills: 0 | Status: SHIPPED | OUTPATIENT
Start: 2021-09-30

## 2021-09-30 RX ADMIN — Medication 1 TABLET: at 08:08

## 2021-09-30 RX ADMIN — LEVOFLOXACIN 750 MG: 750 TABLET, FILM COATED ORAL at 08:08

## 2021-09-30 RX ADMIN — Medication 400 MG: at 08:08

## 2021-09-30 RX ADMIN — FOLIC ACID 1 MG: 1 TABLET ORAL at 08:08

## 2021-09-30 RX ADMIN — THIAMINE HCL TAB 100 MG 100 MG: 100 TAB at 08:08

## 2021-09-30 RX ADMIN — AMOXICILLIN AND CLAVULANATE POTASSIUM 1 TABLET: 875; 125 TABLET, FILM COATED ORAL at 08:08

## 2021-09-30 ASSESSMENT — ACTIVITIES OF DAILY LIVING (ADL)
ADLS_ACUITY_SCORE: 11

## 2021-09-30 ASSESSMENT — MIFFLIN-ST. JEOR: SCORE: 1679.99

## 2021-09-30 NOTE — PLAN OF CARE
"Vitals: /77 (BP Location: Left arm)   Pulse 99   Temp 98.4  F (36.9  C) (Oral)   Resp 18   Ht 1.905 m (6' 3\")   Wt 73.9 kg (163 lb)   SpO2 99%   BMI 20.37 kg/m    Endocrine: ACHS BG checks. 125, 100  Labs: Potassium 3.9  Pain: Denies  PRN's: None given on this shift  Diet: Low carb - 75 gm per day. Fair appetite.   LDA: PIV saline locked and flushing without difficulty  GI: No BM on this shift  : Voiding without difficulty. Not saving.   Skin: Chronic wound to LLE. Wound cleansed and dressing changed per orders. Dry and scabbing skin to toes on LLE.   Neuro: Numbness and tingling present in LLE.   Mobility: Up with SBA and walker. NWB on LLE.   Education: Wound care and follow-up appointments reviewed with patient.   Plan: Discharge patient this afternoon when ride arrives.     "

## 2021-09-30 NOTE — PROGRESS NOTES
DISCHARGE:  Patient with orders to discharge to home.       Discharge instructions, medications & follow ups reviewed with Patient. Copy of discharge summary given to Patient. PIV removed. Belongings returned from security N/A. .    Patient in stable condition. AVSS. Pt had no further questions regarding discharge instructions and medications. Patient transferred out by Patient Transport & left with Sister.  Plan for home care and follow-up appointments as scheduled. Pt sent with 4 day supply wound care supplied. WOC RN to see pt in home tomorrow for wound vac placement.

## 2021-09-30 NOTE — DISCHARGE SUMMARY
[facility]  Discharge Summary - Medicine & Pediatrics       Date of Admission:  9/27/2021  Date of Discharge:  9/30/2021  Discharging Provider: Catalina Araujo DO  Discharge Service: [facility]    Discharge Diagnoses        Open wound of left knee, leg, and ankle, initial encounter  Peripheral artery disease (H)  Encounter for screening laboratory testing for severe acute respiratory syndrome coronavirus 2 (SARS-CoV-2)  Type 2 diabetes mellitus (H)  Moderate protein-calorie malnutrition (H)  Positive culture findings in wound    Follow-ups Needed After Discharge   Plastic surgeons for further wound management and discuss reconstruction of wound    Follow up with , patient's vascular surgeon in 2 weeks    Primary care appointment at [facility] with Dr. Gamboa on October 8   - for post-hospitalization care   - DM and HTN management   - assist in follow up with podiatry for foot cares    Continue home health care nurse visits for wound management    Unresulted Labs Ordered in the Past 30 Days of this Admission     Date and Time Order Name Status Description    9/29/2021 12:01 AM Blood Culture Arm, Left Preliminary     9/29/2021 12:01 AM Blood Culture Hand, Right Preliminary     9/28/2021  9:16 AM Wound Aerobic Bacterial Culture Routine Preliminary     9/28/2021  5:00 AM Blood Culture Arm, Left Preliminary     9/28/2021  5:00 AM Blood Culture Hand, Left Preliminary     9/27/2021 10:09 PM Wound culture Preliminary     9/27/2021  3:01 PM Blood Culture Arm, Left Preliminary     9/27/2021  3:01 PM Blood Culture Peripheral Blood Preliminary       These results will be followed up by PCP    Discharge Disposition   Discharged to home  Condition at discharge: Stable    Hospital Course   Son Mahoney is a 51 year old male admitted on 9/27/2021. He has hx of chronic left foot wound (onset 1/24/21, prior GAS SSTI), PAD, DM2 (complicated by diabetic  neuropathy), AUD, and HTN.  The following conditions were addressed:    The following problems were addressed during his hospitalization:  # Stage 4 Wound Ulcer  # Peripheral arterial disease complicated by peripheral neuropathy  This is a chronic wound that Son has been managing with vascular surgery but due to transportation challenges (4hr drive each way), has not been able to follow up regularly. He has however had home nursing in three times a week for wound vac management.     This would is thought to be related to DM2 and PAD. We had initial concerns for superimposed infection of soft tissues and possibly osteomyelitis, but imaging and culture is reassuring. Wound culture 1+ staph aureus, blood cultures no growth to date. Patient was afebrile and hemodynamically stable during his admission. He received IV vancomycin and Zosyn for 3 days, ID was consulted, and on 9/30, Son was switched to Oral Augmentin and Levaquin for 5 days.     9/28 USG lower extremity showing occluded posterior tibial artery; US EMILY showing non compressible R artery and R sided TBI 0.36 and L sided TBI of 0.42 - both legs showing adequate healing potential and it is very important to Mr. Mahoney that his providers do everything they can to preserve his limb. This is why he travels to his surgeon in [place], who he feels has a positive and optimistic outlook on his limb viability.     Vascular surgery and plastic surgery were consulted; no inpatient procedures were indicated and they recommend outpatient follow up. He was unable to be seen by a podiatrist in the hospital so a referral was made at the time of discharge.    # Type 2 diabetes complicated by peripheral neuropathy  Patient hasn't been taking meds at home; Last visible Hgb A1c on 1/24/2021 is 10.4. 9/27 A1c 6.5% likely from decreased oral intake since returning to alcohol use 6mo ago. TSH levels normal. Gucose continued to be mostly at goal and didn't need insulin during  this hospital stay;  - F/U with [facility] primary care at [place] for outpatient management     # Severe malnutrition  On evaluation patient appears underweight.  Currently suspect approximately 9 months nonweightbearing status combined with return to alcohol use about 6mo ago. PT/OT were consulted. We encouraged PO intake and hope that sobriety will improve his caloric intake.     # HTN, at goal:   Patient is not currently on any medications for his blood pressure. Blood pressure 119-139/71-86 during stay  - F/U with [facility] primary care at [place] for outpatient management     # Hypomagnesemia, resolved  1.5 at admission. Repleted per RN protocol and wnl on 9/29.     # Acute Kidney Injury, resolved  Cr 1.8 on admission, back to baseline with IV fluids and po intake. Suspected pre-intrinsic causes given patient reported decreased PO intake recently.  9/28 FENA was 0.8.    Consultations This Hospital Stay   PHARMACY TO DOSE VANCO  PHYSICAL THERAPY ADULT IP CONSULT  OCCUPATIONAL THERAPY ADULT IP CONSULT  WOUND OSTOMY CONTINENCE NURSE  IP CONSULT  PHARMACY TO DOSE VANCO  NUTRITION SERVICES ADULT IP CONSULT  PSYCHOLOGY ADULT IP CONSULT  SOCIAL WORK IP CONSULT  VASCULAR SURGERY ADULT IP CONSULT  PLASTIC SURGERY IP CONSULT  PODIATRY IP CONSULT  CARE MANAGEMENT / SOCIAL WORK IP CONSULT  INFECTIOUS DISEASE GENERAL ADULT IP CONSULT  VASCULAR ACCESS CARE ADULT IP CONSULT    Code Status   Full Code     The patient was discussed with Catalina Sow, DO [facility]  [facility] [address] [address]  Phone: [phone]  ______________________________________________________________________    ROS:  Denies chest pain, shortness of breath, foot pain.     Physical Exam   Vital Signs: Temp: 98.4  F (36.9  C) Temp src: Oral BP: 120/77 Pulse: 99   Resp: 18 SpO2: 99 % O2 Device: None (Room air)    Weight: 163 lbs 0 oz    General Appearance:  NAD,  pleasant, sitting up in the bed  Respiratory: No increased WOB, chest bilateral  Cardiovascular: RRR, S1,S2 normal, No MRG  GI: non distended, soft, non tender, no HSM, BS+  Skin: Warm and dry in the upper extremities, face, chest and abdomen ; Lower extremities: left sided wound [id number] wound on medial side from below calf to just below medial malleolus - non tender, erythematous base with white slough  Extremities: no pedal edema; DP + bilaterally, PT could not palpated L side; R side PT 1+  Psych: Nl mood, response and affect      Primary Care Physician   Salvador Trotter at [facility], Rickey Gamboa at [facility]    Discharge Orders      Home care nursing referral      Plastic Surgery Referral      Home care nursing referral      Home Care PT Referral for Hospital Discharge      Orthopedic  Referral      Reason for your hospital stay    You were admitted here for the management of an ulcer on your left leg; During this hospital stay, we obtained blood cultures and cultures from the ulcer site, and gave your IV antibiotics. The infectious disease doctors looked at the bacteria growing in your leg wound and gave us recommendations for oral antibiotics. You will take these for the next 5 days     Activity    Your activity upon discharge: activity as tolerated     Follow Up and recommended labs and tests    Follow up with vascular surgeon Salvador Trotter, within 2 weeks.   We have placed a referral for outpatient plastic surgery, who we recommend you see for possible surgical flap to cover the exposed tendon on your leg.    [facility] green team Dr. Gamboa. You have an appt with him October 8th. We are sending your inpatient medical records to their clinic.    The new wound vac and supplies will be delivered to your house tomorrow.    We also placed an order for podiatry outpatient and they will call to set up an appt    Reviewed with patient during discharge summary  review. Pt expressed understanding.     Wound care and dressings    [facility] RN to replace wound vac on 10/1, resume prior settings.     Walker Order    DME Documentation:   Describe the reason for need to support medical necessity: impaired gait, NWB L LE.     I, the undersigned, certify that the above prescribed supplies are medically necessary for this patient and is both reasonable and necessary in reference to accepted standards of medical and necessary in reference to accepted standards of medical practice in the treatment of this patient's condition and is not prescribed as a convenience.     Walker Order    DME Documentation:   Describe the reason for need to support medical necessity: gait instability.     I, the undersigned, certify that the above prescribed supplies are medically necessary for this patient and is both reasonable and necessary in reference to accepted standards of medical and necessary in reference to accepted standards of medical practice in the treatment of this patient's condition and is not prescribed as a convenience.     Diet    Follow this diet upon discharge:       Consistent Carb 75 grams CHO per Meal Diet       Significant Results and Procedures   Most Recent 3 CBC's:Recent Labs   Lab Test 09/30/21  0725 09/29/21  0651 09/28/21  0654   WBC 4.9 4.9 5.3   HGB 11.4* 11.0* 10.8*   MCV 97 95 94    182 165     Most Recent 3 INR's:Recent Labs   Lab Test 09/28/21  0654   INR 1.17*     Most Recent 3 Hemoglobins:Recent Labs   Lab Test 09/30/21  0725 09/29/21  0651 09/28/21  0654   HGB 11.4* 11.0* 10.8*     Most Recent 3 BNP's:No lab results found.  Most Recent ESR & CRP:Recent Labs   Lab Test 09/30/21  0725 09/28/21  0654 09/27/21  1514   SED  --   --  54*   CRP 20.0*   < >  --     < > = values in this interval not displayed.   ,   Results for orders placed or performed during the hospital encounter of 09/27/21   XR Ankle Port Left G/E 3 Views    Narrative     EXAM: XR ANKLE PORT LEFT G/E 3 VIEWS  LOCATION: [facility]  DATE/TIME: 9/27/2021 9:01 PM    INDICATION: Left medial ankle wound, status post abscess IND.  COMPARISON: None.      Impression    IMPRESSION: Diffuse osteopenia with diabetic vascular calcifications. The osteopenia is somewhat more focal in the medial malleolus which is where the ulcer is, and this could be the result of osteomyelitis. This is a difficult determination due to   patchy osteomyelitis throughout. MRI may be helpful for confirmation if this would be clinically helpful.   MR Ankle Left w/o & w Contrast    Narrative    FINAL REPORT    OVER READ BY DR. YOLANDA ONEIL AT 7:03 ON 09/29/2021    REPORT:    TENDONS: There is tendinopathy within the peroneus brevis and longus tendons without evidence for full-thickness tearing. There is a short segment of partial-thickness longitudinal split tearing of the brevis. No tenosynovitis. There is severe posterior   tibial tendon tendinopathy with some intrasubstance partial-thickness tearing. There is less advanced tendinopathy within the flexor digitorum tendon and flexor hallucis tendon without full-thickness tearing. The extensor tendons are negative.    LIGAMENTS: The anterior and posterior syndesmotic ligaments are intact. The anterior and posterior talofibular ligaments are intact. The deltoid ligamentous complex is intact. The calcaneofibular ligament is intact.    BONES AND SOFT TISSUES: There is no evidence for osteomyelitis. There is no evidence for fracture. There is no significant effusion to suggest a septic arthropathy. There is edema or cellulitis seen along the margins of the lower leg with soft tissue   irregularity and likely ulceration medially but no evidence for organized fluid collection or abscess.      Impression    CONCLUSION:  1.  I agree with the original report.  2.  Specifically, there is no evidence for osteomyelitis, septic  arthropathy, or abscess.  3.  There is peroneus brevis and longus tendon tendinopathy with a segment of longitudinal partial-thickness split tearing of the brevis.  4.  There is severe posterior tibial tendon tendinopathy with additional partial-thickness tearing.  5.  There is additional tendinopathy within the flexor hallucis and flexor digitorum tendons.  6.  There is edema or cellulitis about the lower leg with soft tissue ulceration seen medially but no evidence for organized fluid collection.  7.  No evidence for fracture.    Final Interpretation Dictated By: Kehinde Lugo MD  Date: 09/29/2021         PRELIMINARY REPORT    EXAM: MR ANKLE LEFT W/O and W CONTRAST  LOCATION: [facility]  DATE/TIME: 9/28/2021 9:50 PM    INDICATION: Chronic nonhealing ankle/foot wounds. Peripheral vascular disease.  COMPARISON: None.  TECHNIQUE: Routine. Additional postgadolinium T1 sequences were obtained.  IV CONTRAST: 7.5mL Gadavist    FINDINGS:     Images demonstrate no definite changes of osteomyelitis.  There is no soft tissue abscess.  Extensive edema involving medial aspect of ankle and foot. Edema through the intrinsic muscles of the foot.  There is also localized edema at the insertion of the tibialis posterior tendon.    IMPRESSION:  1.  Preliminary report. A final report will be rendered tomorrow morning.  2.  No marco antonio osteomyelitis. No soft tissue abscess.    Preliminary Interpretation Dictated By: Joe Dubon MD  Date: 9/28/2021 10:58 PM       US Lower Extremity Arterial Duplex Bilateral    Narrative    BILATERAL LOWER EXTREMITY DUPLEX ARTERIAL ULTRASOUND 9/28/2021 11:54  AM    CLINICAL HISTORY: PAD with non healing wound.    COMPARISONS: None.    REFERRING PROVIDER: [facility]    TECHNIQUE: Grayscale, color Doppler, Doppler waveform ultrasound  evaluation of bilateral external iliac arteries through anterior and  posterior tibial arteries.    FINDINGS:  RIGHT:        COMMON FEMORAL ARTERY: 99/12 cm/s, triphasic       PROFUNDUS FEMORAL ARTERY: 60 cm/s, triphasic         SUPERFICIAL FEMORAL ARTERY, proximal: 85/9 cm/s, triphasic       SUPERFICIAL FEMORAL ARTERY, mid: 73/4 cm/s, triphasic       SUPERFICIAL FEMORAL ARTERY, distal: 49/7 cm/s, monophasic         POPLITEAL ARTERY, proximal: 57/14 cm/s, monophasic         POSTERIOR TIBIAL ARTERY, ankle: 45/12 cm/s, monophasic       ANTERIOR TIBIAL ARTERY, ankle: 60/10 cm/s, monophasic    LEFT:       COMMON FEMORAL ARTERY: 87/5 cm/s, triphasic       PROFUNDUS FEMORAL ARTERY: 77 cm/s, triphasic         SUPERFICIAL FEMORAL ARTERY, proximal: 62/10 cm/s, triphasic         SUPERFICIAL FEMORAL ARTERY, mid: 58/6 cm/s, monophasic       SUPERFICIAL FEMORAL ARTERY, distal: 77/10 cm/s, monophasic         POPLITEAL ARTERY, proximal: 67/11 cm/s, monophasic         POSTERIOR TIBIAL ARTERY, ankle: Not well seen in the setting of  overlying large wound. Heavily calcified walls. No flow demonstrated.       ANTERIOR TIBIAL ARTERY, ankle: 108/16 cm/s, monophasic      Impression    IMPRESSION:  1. RIGHT: Patent arteries. Monophasic waveforms in the distal SFA and  distally, suggesting hyperemia and/or trifurcation disease.     2. LEFT: Monophasic waveforms in the mid SFA and distally, suggesting  hyperemia distally as well as trifurcation disease. No flow  demonstrated within the posterior tibial artery at the ankle adjacent  to the large wound, likely occluded.       Guidelines:  [facility] duplex criteria for lower limb arterial  occlusive disease  -Percent stenosis- Normal (1-19%): Peak systolic velocity (cm/s):  <150, End-diastolic velocity (cm/s): <40, Velocity ratio (Vr): <1.5,  Distal arterial waveform: Triphasic  -Percent stenosis- 20-49%: Peak systolic velocity (cm/s): 150-200,  End-diastolic velocity (cm/s): <40, Velocity ratio (Vr): 1.5-2.0,  Distal arterial waveform: Triphasic  -Percent stenosis- 50-75%: Peak systolic velocity  (cm/s): 200-300,  End-diastolic velocity (cm/s): <90, Velocity ratio (Vr): 2.0-3.9,  Distal arterial waveform: Poststenotic turbulence distal to stenosis,  monophasic distal waveform  -Percent stenosis- >75%: Peak systolic velocity (cm/s): >300,  End-diastolic velocity (cm/s): <90, Velocity ratio (Vr): >4.0, Distal  arterial waveform: Dampened distal waveform and low PSV/EDV* in the  stenosis  -Percent stenosis- Occlusion: Absent flow by color Doppler/pulsed  Doppler spectral analysis; length of occlusion estimated from distance  between exit and reentry collateral arteries  *PSV = peak systolic velocity, EDV = end-diastolic velocity  http://link.quijano.com/chapter/10.1007/[email or web address]_23/fulltext  html    I have personally reviewed the examination and initial interpretation  and I agree with the findings.    OSCAR SMITH MD         SYSTEM ID:  [id number]    EMILY with PPG wo Exercise    Narrative    RESTING EMILY AND 1ST DIGIT PPG 9/28/2021 11:45 AM    CLINICAL HISTORY: PAD with non-healing wound.    COMPARISONS: None available.    REFERRING PROVIDER: [facility]    TECHNIQUE: Bilateral EMILY, TBI, 1st Toe PPG obtained.    FINDINGS:  RIGHT:       Brachial: 136 mmHg       Ankle (PT): > 254 mmHg       Ankle (DP): > 254 mmHg       1st digit: 49 mmHg         EMILY: N/A (noncompressible)       TBI: 0.36    1st Toe PPG: normal     LEFT:       Brachial: 135 mmHg       Ankle (PT): Not obtained due to large wound       Ankle (DP): Not obtained due to large wound      1st Digit: 57 mmHg         EMILY: N/A       TBI: 0.42    1st Toe PPG: normal       Impression    IMPRESSION:  1. RIGHT:       A. noncompressible arteries.       B. TBI is 0.36, which is abnormal, though a toe pressure of 49  mmHg is compatible with adequate wound healing potential.         2. LEFT:       A. EMILY could not be obtained due to presence of large wound.       B. TBI is 0.42, which is abnormal, though a total pressure of 57  mmHg is compatible with  adequate healing potential.     Guidelines:    EMILY Diagnostic Criteria (Based on criteria published in Circulation  2011; 124: 6652-0733):    > 1.4: Non compressible    1.00 - 1.40: Normal    0.91 - 0.99: Borderline    At or below 0.90: Abnormal    EMILY Diagnostic Criteria (Based on ACC/AHA guideline 2008):    >/=1.3 - non compressible vessels    1.00  -1.29 - Normal    0.91 - 0.99 - Borderline    0.41 - 0.90 - Mild to moderate PAD    0.00 - 0.40 - Severe PAD    I have personally reviewed the examination and initial interpretation  and I agree with the findings.    OSCAR SMITH MD         SYSTEM ID:  [id number]       Discharge Medications   Discharge Medication List as of 9/30/2021  2:18 PM      START taking these medications    Details   amoxicillin-clavulanate (AUGMENTIN) 875-125 MG tablet Take 1 tablet by mouth every 12 hours, Disp-10 tablet, R-0, E-PrescribeTake last dose Oct 5      multivitamin w/minerals (THERA-VIT-M) tablet Take 1 tablet by mouth daily, Disp-30 tablet, R-0, E-Prescribe         CONTINUE these medications which have CHANGED    Details   levofloxacin (LEVAQUIN) 750 MG tablet Take 1 tablet (750 mg) by mouth daily for 4 days, Disp-4 tablet, R-0, E-PrescribeTake last dose Oct 4         CONTINUE these medications which have NOT CHANGED    Details   Wound Cleansers (VASHE WOUND THERAPY) SOLN USE 3 TIMES DAILY FOR WET OR DRY DRESSING CHANGES AS DIRECTED, Historical         STOP taking these medications       cephALEXin (KEFLEX) 500 MG capsule Comments:   Reason for Stopping:             Allergies   No Known Allergies

## 2021-09-30 NOTE — PROGRESS NOTES
No acute changes this shift. IV SL and antibiotics changed to PO yesterday. Weight stable this AM. Denied pain, voiding fine per urinal or walker and NWB on left leg to BR. No c/o SOB or cough. Possible discharge home today.

## 2021-09-30 NOTE — PROGRESS NOTES
Care Management Discharge Note    Discharge Date: 09/30/2021       Discharge Disposition: Home, Home Care, DME    Discharge Services:  (Pt applied for PCA services through [facility])    Discharge DME: Wound Vac    Discharge Transportation: family or friend will provide      Education Provided on the Discharge Plan:  yes  Persons Notified of Discharge Plans: Patient  Patient/Family in Agreement with the Plan: yes    Handoff Referral Completed: Yes    Additional Information:  Met with pt.  Per discussion with pt he is waiting to see podiatry today and plans to discharge today.  Per pt provider team arranged for new PCP through [facility] - Dr. Gamboa on 10/8.  Pt states his mom has not called about exchanging his vac.  Pt also asking for additional wound vac supplies.  Agreed to have bedside RN provide dressing supplies for wound care currently being prescribed.  Writer agreed to f/u with [facility] to discuss exchanging home wound vac and pt request for additional dressing supplies.    Reviewed above with ZULEIKA Valerio.  Per Iman pt had informed team this morning that he had a ride at [place].  Per care team pt would not be seen by podiatry today.   Pt should be able to discharge home once his sister arrives at [place].    Paged team to clarify discharge time and podiatry plan.    Spoke with [facility] [phone] regarding exchanging wound vac and need for additional wound vac dressing supplies.  Per discussion with [facility] will be able to deliver a new wound vac to pt home tomorrow.   Reviewed pt request for additional wound vac dressing supplies.  [facility] will deliver 8 dressing kits, 6 canisters and 4 sets of tubing    Received f/u call from provider team.  Pt will not be seen by podiatry today unless pt feels strongly about needing to be seen prior to discharge.  Pt will  have outpatient follow up arranged.  Reviewed above information regarding wound vac and dressing supplies.     Met with pt  reviewed above.  Pt ok with outpatient follow up.  Per pt his sister will be here early afternoon to transport him home.  Reviewed information on transportation thorough [facility] program.      Outpatient Services:  [facility]   [phone]  Wound vac and dressing supplies    [facility]  [phone]  Fax: [phone]  Public Health RN for wound vac dressing changes.     Lorna Pal RN BSN, PHN, ACM-RN  7A RN Care Coordinator  Phone: [phone]  Pager [phone]    9/30/2021 9:36 AM

## 2021-09-30 NOTE — CONSULTS
Assessment: Son is a 52 YO diabetic with onychomycosis and elongated nails. He was admitted with left leg wound that was managed by [facility]. Nails are mycotic. He does have diabetic neuropathy.    Plan:  - Pt seen and evaluated.  - Nails debrided x 9. No nail on the left 2nd digit. Full lysis noted to the left hallux and 3rd digit and the right hallux. These were completely avulsed. No anesthesia was needed.  - He can follow up with outpatient podiatry.    HPI: Son is a 52 YO diabetic with onychomycosis. He is discharging today. Was admitted to [facility] with left leg ulcers.    Past Medical History:   Diagnosis Date     Diabetes mellitus, type 2 (H)      PVD (peripheral vascular disease) (H)      History reviewed. No pertinent surgical history.  Social History     Socioeconomic History     Marital status: Patient Declined     Spouse name: Not on file     Number of children: Not on file     Years of education: Not on file     Highest education level: Not on file   Occupational History     Not on file   Tobacco Use     Smoking status: Never Smoker     Smokeless tobacco: Never Used   Substance and Sexual Activity     Alcohol use: Yes     Drug use: Not Currently     Sexual activity: Not on file   Other Topics Concern     Not on file   Social History Narrative     Not on file     Social Determinants of Health     Financial Resource Strain:      Difficulty of Paying Living Expenses:    Food Insecurity:      Worried About Running Out of Food in the Last Year:      Ran Out of Food in the Last Year:    Transportation Needs:      Lack of Transportation (Medical):      Lack of Transportation (Non-Medical):    Physical Activity:      Days of Exercise per Week:      Minutes of Exercise per Session:    Stress:      Feeling of Stress :    Social Connections:      Frequency of Communication with Friends and Family:      Frequency of Social Gatherings with Friends and Family:      Attends [employer] Services:      Active Member of Clubs or  Organizations:      Attends Club or Organization Meetings:      Marital Status:    Intimate Partner Violence:      Fear of Current or Ex-Partner:      Emotionally Abused:      Physically Abused:      Sexually Abused:       No Known Allergies    PE:  DP and PT pulses 2/4. CRT 1 second.  Sensation diminished.  Equinus noted BL.  Previously avulsed nail on the left 2nd digit. Onychomycosis to remaining digits.

## 2021-10-01 LAB — BACTERIA WND CULT: ABNORMAL

## 2021-10-02 LAB
BACTERIA BLD CULT: NO GROWTH
BACTERIA BLD CULT: NO GROWTH

## 2021-10-03 LAB
BACTERIA BLD CULT: NO GROWTH
BACTERIA BLD CULT: NO GROWTH

## 2021-10-04 LAB
BACTERIA BLD CULT: NO GROWTH
BACTERIA BLD CULT: NO GROWTH

## 2021-10-05 LAB
BACTERIA WND CULT: ABNORMAL

## 2022-08-14 ENCOUNTER — NURSE TRIAGE (OUTPATIENT)
Dept: NURSING | Facility: CLINIC | Age: 52
End: 2022-08-14

## 2022-08-14 NOTE — TELEPHONE ENCOUNTER
Dr. Davis-in [place]-calls and says that he is looking to speak to an orthopedic Dr. At the [facility] ER. RN then gave Dr. Davis the # to that ER, as requested,  and Dr. Davis says that he will call that # now. COVID 19 Nurse Triage Plan/Patient Instructions    Please be aware that novel coronavirus (COVID-19) may be circulating in the community. If you develop symptoms such as fever, cough, or SOB or if you have concerns about the presence of another infection including coronavirus (COVID-19), please contact your health care provider or visit [email or web address].     Disposition/Instructions    Home care recommended. Follow home care protocol based instructions.    Thank you for taking steps to prevent the spread of this virus.  o Limit your contact with others.  o Wear a simple mask to cover your cough.  o Wash your hands well and often.    Resources    [facility]: About COVID-19: [email or web address]    CDC: What to Do If You're Sick: www.cdc.gov/coronavirus/2019-ncov/about/steps-when-sick.html    CDC: Ending Home Isolation: www.cdc.gov/coronavirus/2019-ncov/hcp/disposition-in-home-patients.html     CDC: Caring for Someone: www.cdc.gov/coronavirus/2019-ncov/if-you-are-sick/care-for-someone.html     [facility]: Interim Guidance for Hospital Discharge to Home: www.health.[facility].[place].us/diseases/coronavirus/hcp/hospdischarge.pdf    [facility] clinical trials (COVID-19 research studies): [email or web address].[email or web address]/n-clinical-trials     Below are the COVID-19 hotlines at the [facility] ([facility]). Interpreters are available.   o For health questions: Call 354-021-3827 or 1-987.198.1122 (7 a.m. to 7 p.m.)  o For questions about schools and childcare: Call 301-547-8230 or 1-152.926.7624 (7 a.m. to 7 p.m.)                     Reason for Disposition    General information question, no triage required and triager able to answer question    Additional Information     Negative: [1] Caller is not with the adult (patient) AND [2] reporting urgent symptoms    Negative: Lab result questions    Negative: Medication questions    Negative: Caller can't be reached by phone    Negative: Caller has already spoken to PCP or another triager    Negative: RN needs further essential information from caller in order to complete triage    Negative: Requesting regular office appointment    Negative: [1] Caller requesting NON-URGENT health information AND [2] PCP's office is the best resource    Negative: Health Information question, no triage required and triager able to answer question    Protocols used: INFORMATION ONLY CALL - NO TRIAGE-A-